# Patient Record
Sex: FEMALE | Race: WHITE | NOT HISPANIC OR LATINO | Employment: FULL TIME | URBAN - METROPOLITAN AREA
[De-identification: names, ages, dates, MRNs, and addresses within clinical notes are randomized per-mention and may not be internally consistent; named-entity substitution may affect disease eponyms.]

---

## 2019-06-18 ENCOUNTER — TELEPHONE (OUTPATIENT)
Dept: SCHEDULING | Facility: IMAGING CENTER | Age: 29
End: 2019-06-18

## 2019-08-02 ENCOUNTER — OFFICE VISIT (OUTPATIENT)
Dept: MEDICAL GROUP | Facility: LAB | Age: 29
End: 2019-08-02
Payer: COMMERCIAL

## 2019-08-02 VITALS
SYSTOLIC BLOOD PRESSURE: 110 MMHG | HEART RATE: 72 BPM | OXYGEN SATURATION: 96 % | BODY MASS INDEX: 36.15 KG/M2 | WEIGHT: 217 LBS | TEMPERATURE: 98.4 F | DIASTOLIC BLOOD PRESSURE: 70 MMHG | HEIGHT: 65 IN

## 2019-08-02 DIAGNOSIS — E66.9 OBESITY (BMI 35.0-39.9 WITHOUT COMORBIDITY): ICD-10-CM

## 2019-08-02 DIAGNOSIS — Z00.00 WELL ADULT EXAM: ICD-10-CM

## 2019-08-02 PROCEDURE — 99385 PREV VISIT NEW AGE 18-39: CPT | Performed by: NURSE PRACTITIONER

## 2019-08-02 ASSESSMENT — PATIENT HEALTH QUESTIONNAIRE - PHQ9: CLINICAL INTERPRETATION OF PHQ2 SCORE: 0

## 2019-08-02 NOTE — LETTER
[facility]  JUDY NiRChayaN.  [address]  [place] [address]  Fax: [phone]   Authorization for Release/Disclosure of   Protected Health Information   Name: CHEYANNE CAVAZOS : 1990 SSN: [id number]   Address: [facility] [address] [place] [place] [address] Phone:    [phone] (home)    I authorize the entity listed below to release/disclose the PHI below to:   [facility]/JUDY NiRJACINTO and DAVID Ni   Provider or Entity Name:     Address   City, State, Zip   Phone:      Fax:     Reason for request: continuity of care   Information to be released:    [  ] LAST COLONOSCOPY,  including any PATH REPORT and follow-up  [  ] LAST FIT/COLOGUARD RESULT [  ] LAST DEXA  [  ] LAST MAMMOGRAM  [  ] LAST PAP  [  ] LAST LABS [  ] RETINA EXAM REPORT  [  ] IMMUNIZATION RECORDS  [  ] Release all info      [  ] Check here and initial the line next to each item to release ALL health information INCLUDING  _____ Care and treatment for drug and / or alcohol abuse  _____ HIV testing, infection status, or AIDS  _____ Genetic Testing    DATES OF SERVICE OR TIME PERIOD TO BE DISCLOSED: _____________  I understand and acknowledge that:  * This Authorization may be revoked at any time by you in writing, except if your health information has already been used or disclosed.  * Your health information that will be used or disclosed as a result of you signing this authorization could be re-disclosed by the recipient. If this occurs, your re-disclosed health information may no longer be protected by State or Federal laws.  * You may refuse to sign this Authorization. Your refusal will not affect your ability to obtain treatment.  * This Authorization becomes effective upon signing and will  on (date) __________.      If no date is indicated, this Authorization will  one (1) year from the signature date.    Name: Cheyanne Cavazos    Signature:   Date:     2019            PLEASE FAX REQUESTED RECORDS BACK TO: [phone]

## 2019-08-02 NOTE — PROGRESS NOTES
Subjective:     CC:   Chief Complaint   Patient presents with   • Establish Care     Annual exam       HPI:   Cheyanne Cavazos is a 29 y.o. female who presents for annual exam. She is feeling well and denies any complaints.    Ob-Gyn/ History:    Patient has GYN provider: yes  Clara rainey  /Para:  Last Pap Smear:  2017. No history of abnormal pap smears.  Gyn Surgery:  no  Current Contraceptive Method:  No. She is currently sexually active.  Last menstrual period:  2019.  Periods regular. moderate bleeding. Cramping is mild.   She does not take OTC analgesics for cramps.  No significant bloating/fluid retention, pelvic pain, or dyspareunia. No vaginal discharge      Health Maintenance  Cholesterol Screening: Lipid panel ordered  Diabetes Screening: Fasting blood sugar ordered  Aspirin Use: Declines  Diet: Low carb/loose keto.  Exercise: No regular exercise but is very active throughout the day with a toddler.  Substance Abuse: Declines.  Has distant history of IV drug use.  Safe in relationship.  Yes  Seat belts, bike helmet, gun safety discussed.  Sun protection used.    Cancer screening  Colorectal Cancer Screening: Due at age 49  Lung Cancer Screening: Not indicated  Cervical Cancer Screening: Done in 2017 with the birth of her baby.  Records requested  Breast Cancer Screening: Due at age 39    Infectious disease screening/Immunizations  --STI Screening: Declines  --Practices safe sex.  --HIV Screening: Declined  --Hepatitis C Screening: Not indicated  --Immunizations:    Influenza: Annually   Tetanus: 2017 up-to-date.        She  has a past medical history of Functional ovarian cysts, History of intravenous drug use in remission (2014), Mood disorder (HCC) (2014), and Seasonal allergies. She also has no past medical history of Diabetes.  She  has a past surgical history that includes tonsillectomy ().    Family History   Problem Relation Age of Onset   • Non-contributory Mother     • Non-contributory Father    • Cancer Paternal Uncle         lung cancer   • Cancer Maternal Grandfather    • Cancer Paternal Grandfather    • Heart Attack Paternal Grandfather         x 2   • Hypertension Maternal Aunt    • Diabetes Neg Hx    • Stroke Neg Hx        Social History     Socioeconomic History   • Marital status: Single     Spouse name: Not on file   • Number of children: 0   • Years of education: Not on file   • Highest education level: Not on file   Occupational History   • Occupation: sales     Employer: [employer]   Social Needs   • Financial resource strain: Not on file   • Food insecurity:     Worry: Not on file     Inability: Not on file   • Transportation needs:     Medical: Not on file     Non-medical: Not on file   Tobacco Use   • Smoking status: Former Smoker     Packs/day: 0.50     Years: 8.00     Pack years: 4.00     Start date: 1/1/2006     Last attempt to quit: 1/1/2016     Years since quitting: 3.5   • Smokeless tobacco: Never Used   Substance and Sexual Activity   • Alcohol use: Yes     Comment: occ   • Drug use: No     Types: IV     Comment: hx of heroin for about 4 months clean x 1 year   • Sexual activity: Never     Partners: Male   Lifestyle   • Physical activity:     Days per week: Not on file     Minutes per session: Not on file   • Stress: Not on file   Relationships   • Social connections:     Talks on phone: Not on file     Gets together: Not on file     Attends Jewish service: Not on file     Active member of club or organization: Not on file     Attends meetings of clubs or organizations: Not on file     Relationship status: Not on file   • Intimate partner violence:     Fear of current or ex partner: Not on file     Emotionally abused: Not on file     Physically abused: Not on file     Forced sexual activity: Not on file   Other Topics Concern   • Not on file   Social History Narrative   • Not on file       Patient Active Problem List    Diagnosis Date Noted   • History  "of intravenous drug use in remission 02/21/2014   • Mood disorder (HCC) 02/21/2014   • Tobacco dependence 02/21/2014   • Insomnia 02/21/2014         Current Outpatient Medications   Medication Sig Dispense Refill   • acetaminophen (TYLENOL) 325 MG TABS Take 650 mg by mouth every four hours as needed.     • risperidone (RISPERDAL) 1 MG TABS Take 1 Tab by mouth every bedtime. (Patient not taking: Reported on 8/2/2019) 30 Tab 0     No current facility-administered medications for this visit.      Allergies   Allergen Reactions   • Amoxicillin        Review of Systems   Constitutional: Negative for fever, chills.  Positive for fatigue and weight gain  HENT: Negative for congestion.    Eyes: Negative for pain.   Respiratory: Negative for cough and shortness of breath.    Cardiovascular: Negative for leg swelling.   Gastrointestinal: Negative for nausea, vomiting, abdominal pain and diarrhea.   Genitourinary: Negative for dysuria and hematuria.   Skin: Negative for rash.  Positive for dry skin  Neurological: Negative for dizziness, focal weakness and headaches.   Endo/Heme/Allergies: Does not bruise/bleed easily.   Psychiatric/Behavioral: Negative for depression.  The patient is not nervous/anxious.      Objective:     /70 (BP Location: Right arm, Patient Position: Sitting, BP Cuff Size: Large adult)   Pulse 72   Temp 36.9 °C (98.4 °F) (Temporal)   Ht 1.651 m (5' 5\")   Wt 98.4 kg (217 lb)   LMP 07/23/2019 (Exact Date)   SpO2 96%   BMI 36.11 kg/m²   Body mass index is 36.11 kg/m².  Wt Readings from Last 4 Encounters:   08/02/19 98.4 kg (217 lb)   07/16/15 88.5 kg (195 lb)   02/21/14 97.4 kg (214 lb 12.8 oz)   05/05/12 79.4 kg (175 lb)       Physical Exam:  Constitutional: Obese.  Well-developed and well-nourished. Not diaphoretic. No distress.   Skin: Skin is warm and dry. No rash noted.  Head: Atraumatic without lesions.  Eyes: Conjunctivae and extraocular motions are normal. Pupils are equal, round, and " reactive to light. No scleral icterus.   Ears:  External ears unremarkable. Tympanic membranes clear and intact.  Nose: Nares patent. Septum midline. Turbinates without erythema nor edema. No discharge.   Mouth/Throat:Tongue normal. Oropharynx is clear and moist. Posterior pharynx without erythema or exudates.  Neck: Supple, trachea midline. Normal range of motion. No thyromegaly present. No lymphadenopathy--cervical or supraclavicular.  Cardiovascular: Regular rate and rhythm, S1 and S2 without murmur, rubs, or gallops.  Lungs: Normal inspiratory effort, CTA bilaterally, no wheezes/rhonchi/rales  Abdomen: Soft, non tender, and without distention. Active bowel sounds in all four quadrants. No rebound, guarding, masses or HSM.  Extremities: No cyanosis, clubbing, erythema, nor edema. Distal pulses intact and symmetric.   Musculoskeletal: All major joints AROM full in all directions without pain.  Neurological: Alert and oriented x 3. Sensation intact. Negative Rhomberg.  Psychiatric:  Behavior, mood, and affect are appropriate.      Assessment and Plan:     1. Well adult exam  Patient identified as having weight management issue.  Appropriate orders and counseling given.    CBC WITH DIFFERENTIAL    Comp Metabolic Panel    HEMOGLOBIN A1C    Lipid Profile    VITAMIN D,25 HYDROXY    TSH    FREE THYROXINE   2. Obesity (BMI 35.0-39.9 without comorbidity)  Patient identified as having weight management issue.  Appropriate orders and counseling given.    CBC WITH DIFFERENTIAL    Comp Metabolic Panel    HEMOGLOBIN A1C    Lipid Profile    VITAMIN D,25 HYDROXY    TSH    FREE THYROXINE       HCM: Reviewed with patient  All Healthcare maintenance up-to-date  Pap requested from prior provider  Labs per orders  Immunizations per orders  Patient counseled about skin care, diet, supplements, prenatal vitamins, safe sex and exercise.    Follow-up: Return in about 1 year (around 8/2/2020) for Preventative/Annual physical.

## 2019-08-10 ENCOUNTER — HOSPITAL ENCOUNTER (OUTPATIENT)
Dept: LAB | Facility: MEDICAL CENTER | Age: 29
End: 2019-08-10
Attending: NURSE PRACTITIONER
Payer: COMMERCIAL

## 2019-08-10 DIAGNOSIS — Z00.00 WELL ADULT EXAM: ICD-10-CM

## 2019-08-10 DIAGNOSIS — E66.9 OBESITY (BMI 35.0-39.9 WITHOUT COMORBIDITY): ICD-10-CM

## 2019-08-10 LAB
25(OH)D3 SERPL-MCNC: 24 NG/ML (ref 30–100)
ALBUMIN SERPL BCP-MCNC: 4.1 G/DL (ref 3.2–4.9)
ALBUMIN/GLOB SERPL: 1.4 G/DL
ALP SERPL-CCNC: 40 U/L (ref 30–99)
ALT SERPL-CCNC: 16 U/L (ref 2–50)
ANION GAP SERPL CALC-SCNC: 8 MMOL/L (ref 0–11.9)
AST SERPL-CCNC: 15 U/L (ref 12–45)
BASOPHILS # BLD AUTO: 0.4 % (ref 0–1.8)
BASOPHILS # BLD: 0.02 K/UL (ref 0–0.12)
BILIRUB SERPL-MCNC: 0.3 MG/DL (ref 0.1–1.5)
BUN SERPL-MCNC: 11 MG/DL (ref 8–22)
CALCIUM SERPL-MCNC: 9.5 MG/DL (ref 8.5–10.5)
CHLORIDE SERPL-SCNC: 104 MMOL/L (ref 96–112)
CHOLEST SERPL-MCNC: 198 MG/DL (ref 100–199)
CO2 SERPL-SCNC: 27 MMOL/L (ref 20–33)
CREAT SERPL-MCNC: 0.7 MG/DL (ref 0.5–1.4)
EOSINOPHIL # BLD AUTO: 0.05 K/UL (ref 0–0.51)
EOSINOPHIL NFR BLD: 1.1 % (ref 0–6.9)
ERYTHROCYTE [DISTWIDTH] IN BLOOD BY AUTOMATED COUNT: 45.4 FL (ref 35.9–50)
EST. AVERAGE GLUCOSE BLD GHB EST-MCNC: 108 MG/DL
GLOBULIN SER CALC-MCNC: 3 G/DL (ref 1.9–3.5)
GLUCOSE SERPL-MCNC: 88 MG/DL (ref 65–99)
HBA1C MFR BLD: 5.4 % (ref 0–5.6)
HCT VFR BLD AUTO: 41.7 % (ref 37–47)
HDLC SERPL-MCNC: 45 MG/DL
HGB BLD-MCNC: 13.6 G/DL (ref 12–16)
IMM GRANULOCYTES # BLD AUTO: 0.01 K/UL (ref 0–0.11)
IMM GRANULOCYTES NFR BLD AUTO: 0.2 % (ref 0–0.9)
LDLC SERPL CALC-MCNC: 142 MG/DL
LYMPHOCYTES # BLD AUTO: 1.69 K/UL (ref 1–4.8)
LYMPHOCYTES NFR BLD: 35.6 % (ref 22–41)
MCH RBC QN AUTO: 30.4 PG (ref 27–33)
MCHC RBC AUTO-ENTMCNC: 32.6 G/DL (ref 33.6–35)
MCV RBC AUTO: 93.1 FL (ref 81.4–97.8)
MONOCYTES # BLD AUTO: 0.33 K/UL (ref 0–0.85)
MONOCYTES NFR BLD AUTO: 6.9 % (ref 0–13.4)
NEUTROPHILS # BLD AUTO: 2.65 K/UL (ref 2–7.15)
NEUTROPHILS NFR BLD: 55.8 % (ref 44–72)
NRBC # BLD AUTO: 0 K/UL
NRBC BLD-RTO: 0 /100 WBC
PLATELET # BLD AUTO: 261 K/UL (ref 164–446)
PMV BLD AUTO: 10.4 FL (ref 9–12.9)
POTASSIUM SERPL-SCNC: 4.4 MMOL/L (ref 3.6–5.5)
PROT SERPL-MCNC: 7.1 G/DL (ref 6–8.2)
RBC # BLD AUTO: 4.48 M/UL (ref 4.2–5.4)
SODIUM SERPL-SCNC: 139 MMOL/L (ref 135–145)
T4 FREE SERPL-MCNC: 0.93 NG/DL (ref 0.53–1.43)
TRIGL SERPL-MCNC: 53 MG/DL (ref 0–149)
TSH SERPL DL<=0.005 MIU/L-ACNC: 1.52 UIU/ML (ref 0.38–5.33)
WBC # BLD AUTO: 4.8 K/UL (ref 4.8–10.8)

## 2019-08-10 PROCEDURE — 84439 ASSAY OF FREE THYROXINE: CPT

## 2019-08-10 PROCEDURE — 36415 COLL VENOUS BLD VENIPUNCTURE: CPT

## 2019-08-10 PROCEDURE — 84443 ASSAY THYROID STIM HORMONE: CPT

## 2019-08-10 PROCEDURE — 85025 COMPLETE CBC W/AUTO DIFF WBC: CPT

## 2019-08-10 PROCEDURE — 82306 VITAMIN D 25 HYDROXY: CPT

## 2019-08-10 PROCEDURE — 83036 HEMOGLOBIN GLYCOSYLATED A1C: CPT

## 2019-08-10 PROCEDURE — 80061 LIPID PANEL: CPT

## 2019-08-10 PROCEDURE — 80053 COMPREHEN METABOLIC PANEL: CPT

## 2019-09-26 ENCOUNTER — TELEPHONE (OUTPATIENT)
Dept: MEDICAL GROUP | Facility: LAB | Age: 29
End: 2019-09-26

## 2019-09-26 NOTE — TELEPHONE ENCOUNTER
VOICEMAIL  1. Caller Name: Cheyanne                      Call Back Number: [phone] (home)     2. Message: Cheyanne left a voicemail, she would like her lab results.    3. Patient approves office to leave a detailed voicemail/MyChart message: yes

## 2019-11-19 ENCOUNTER — OFFICE VISIT (OUTPATIENT)
Dept: MEDICAL GROUP | Facility: LAB | Age: 29
End: 2019-11-19
Payer: COMMERCIAL

## 2019-11-19 VITALS
HEIGHT: 65 IN | BODY MASS INDEX: 38.05 KG/M2 | DIASTOLIC BLOOD PRESSURE: 70 MMHG | RESPIRATION RATE: 18 BRPM | TEMPERATURE: 98.1 F | SYSTOLIC BLOOD PRESSURE: 112 MMHG | WEIGHT: 228.4 LBS | OXYGEN SATURATION: 97 % | HEART RATE: 68 BPM

## 2019-11-19 DIAGNOSIS — F33.1 MODERATE EPISODE OF RECURRENT MAJOR DEPRESSIVE DISORDER (HCC): ICD-10-CM

## 2019-11-19 DIAGNOSIS — F41.9 ANXIETY: ICD-10-CM

## 2019-11-19 PROCEDURE — 99214 OFFICE O/P EST MOD 30 MIN: CPT | Performed by: NURSE PRACTITIONER

## 2019-11-19 RX ORDER — HYDROXYZINE 50 MG/1
50 TABLET, FILM COATED ORAL 3 TIMES DAILY PRN
Qty: 30 TAB | Refills: 1 | Status: SHIPPED | OUTPATIENT
Start: 2019-11-19 | End: 2019-11-19 | Stop reason: SDUPTHER

## 2019-11-19 RX ORDER — HYDROXYZINE 50 MG/1
50 TABLET, FILM COATED ORAL 3 TIMES DAILY PRN
Qty: 30 TAB | Refills: 1 | Status: SHIPPED | OUTPATIENT
Start: 2019-11-19 | End: 2023-04-28 | Stop reason: SDUPTHER

## 2019-11-19 RX ORDER — SERTRALINE HYDROCHLORIDE 25 MG/1
25 TABLET, FILM COATED ORAL DAILY
Qty: 30 TAB | Refills: 2 | Status: SHIPPED | OUTPATIENT
Start: 2019-11-19 | End: 2019-11-19 | Stop reason: SDUPTHER

## 2019-11-19 RX ORDER — SERTRALINE HYDROCHLORIDE 25 MG/1
25 TABLET, FILM COATED ORAL DAILY
Qty: 30 TAB | Refills: 2 | Status: SHIPPED
Start: 2019-11-19 | End: 2020-01-14

## 2019-11-19 ASSESSMENT — ANXIETY QUESTIONNAIRES
3. WORRYING TOO MUCH ABOUT DIFFERENT THINGS: NEARLY EVERY DAY
2. NOT BEING ABLE TO STOP OR CONTROL WORRYING: SEVERAL DAYS
GAD7 TOTAL SCORE: 10
1. FEELING NERVOUS, ANXIOUS, OR ON EDGE: SEVERAL DAYS
7. FEELING AFRAID AS IF SOMETHING AWFUL MIGHT HAPPEN: SEVERAL DAYS
5. BEING SO RESTLESS THAT IT IS HARD TO SIT STILL: NOT AT ALL
4. TROUBLE RELAXING: MORE THAN HALF THE DAYS
6. BECOMING EASILY ANNOYED OR IRRITABLE: MORE THAN HALF THE DAYS

## 2019-11-19 ASSESSMENT — PATIENT HEALTH QUESTIONNAIRE - PHQ9
SUM OF ALL RESPONSES TO PHQ9 QUESTIONS 1 AND 2: 3
6. FEELING BAD ABOUT YOURSELF - OR THAT YOU ARE A FAILURE OR HAVE LET YOURSELF OR YOUR FAMILY DOWN: SEVERAL DAYS
3. TROUBLE FALLING OR STAYING ASLEEP OR SLEEPING TOO MUCH: MORE THAN HALF THE DAYS
2. FEELING DOWN, DEPRESSED, IRRITABLE, OR HOPELESS: SEVERAL DAYS
SUM OF ALL RESPONSES TO PHQ QUESTIONS 1-9: 11
7. TROUBLE CONCENTRATING ON THINGS, SUCH AS READING THE NEWSPAPER OR WATCHING TELEVISION: NOT AT ALL
5. POOR APPETITE OR OVEREATING: SEVERAL DAYS
4. FEELING TIRED OR HAVING LITTLE ENERGY: NEARLY EVERY DAY
8. MOVING OR SPEAKING SO SLOWLY THAT OTHER PEOPLE COULD HAVE NOTICED. OR THE OPPOSITE, BEING SO FIGETY OR RESTLESS THAT YOU HAVE BEEN MOVING AROUND A LOT MORE THAN USUAL: NOT AT ALL
9. THOUGHTS THAT YOU WOULD BE BETTER OFF DEAD, OR OF HURTING YOURSELF: SEVERAL DAYS
1. LITTLE INTEREST OR PLEASURE IN DOING THINGS: MORE THAN HALF THE DAYS

## 2019-11-19 NOTE — PROGRESS NOTES
CC: Depression; anxiety    HISTORY OF PRESENT ILLNESS: Cheyanne Cavazos is a 29 y.o. female established patient who presents today to discuss the following problems:    Depression  Anxiety  This is a new problem discussed which has been long-standing for patient.  She has tried controlling symptoms with diet and exercise and at this point comes in to discuss medication options.  She is considering trying to become pregnant next spring and would like to control depression prior to that time.   Onset was approximately 10 years ago, unchanged since that time. Current symptoms include depressed mood, anhedonia, weight gain, insomnia, fatigue, feelings of worthlessness/guilt and suicidal thoughts without plan. Patient denies weight loss, hypersomnia, psychomotor retardation, suicidal thoughts with specific plan and suicidal attempt. She denies current suicidal and homicidal plan or intent. Family history significant for depression,. Possible organic causes contributing are: none. Risk factors: positive family history in patient's mother Previous treatment includes none.   She does have a history of being seen by psychiatry when she was using drugs and put on risperidone for unknown reasons.  She denies any history of manic behavior or panic attacks.       Depression Screening    Little interest or pleasure in doing things?   More than half the days  Feeling down, depressed , or hopeless?  Several days  Trouble falling or staying asleep, or sleeping too much?   More than half the days  Feeling tired or having little energy?   Nearly every day  Poor appetite or overeating?   Several days  Feeling bad about yourself - or that you are a failure or have let yourself or your family down?  Several days  Trouble concentrating on things, such as reading the newspaper or watching television?  Not at all  Moving or speaking so slowly that other people could have noticed.  Or the opposite - being so fidgety or restless that you have  been moving around a lot more than usual?   Not at all  Thoughts that you would be better off dead, or of hurting yourself?   Several days  Patient Health Questionnaire Score:  (!) 11      If depressive symptoms identified deferred to follow up visit unless specifically addressed in assesment and plan.    Interpretation of PHQ-9 Total Score   Score Severity   1-4 No Depression   5-9 Mild Depression   10-14 Moderate Depression   15-19 Moderately Severe Depression   20-27 Severe Depression  MARIAM-7 Questionnaire    Feeling nervous, anxious, or on edge: Several days  Not being able to sop or control worrying: Several days  Worrying too much about different things: Nearly every day  Trouble relaxing: More than half the days  Being so restless that it's hard to sit still: Not at all  Becoming easily annoyed or irritable: More than half the days  Feeling afraid as if something awful might happen: Several days  Total: 10    Interpretation of MARIAM 7 Total Score   Score Severity :  0-4 No Anxiety   5-9 Mild Anxiety  10-14 Moderate Anxiety  15-21 Severe Anxiety      Health Maintenance: Completed    Patient Active Problem List    Diagnosis Date Noted   • Obesity (BMI 35.0-39.9 without comorbidity) ([facility]) 08/02/2019   • History of intravenous drug use in remission 02/21/2014   • Mood disorder ([facility]) 02/21/2014   • Tobacco dependence 02/21/2014   • Insomnia 02/21/2014        Allergies:Amoxicillin    Current Outpatient Medications   Medication Sig Dispense Refill   • acetaminophen (TYLENOL) 325 MG TABS Take 650 mg by mouth every four hours as needed.     • risperidone (RISPERDAL) 1 MG TABS Take 1 Tab by mouth every bedtime. (Patient not taking: Reported on 8/2/2019) 30 Tab 0     No current facility-administered medications for this visit.        Social History     Tobacco Use   • Smoking status: Former Smoker     Packs/day: 0.50     Years: 8.00     Pack years: 4.00     Start date: 1/1/2006     Last attempt to quit: 1/1/2016     Years  "since quitting: 3.8   • Smokeless tobacco: Never Used   Substance Use Topics   • Alcohol use: Yes     Comment: occ   • Drug use: No     Types: IV     Comment: hx of heroin for about 4 months clean x 1 year     Social History     Patient does not qualify to have social determinant information on file (likely too young).   Social History Narrative   • Not on file       Family History   Problem Relation Age of Onset   • Non-contributory Mother    • Non-contributory Father    • Cancer Paternal Uncle         lung cancer   • Cancer Maternal Grandfather    • Cancer Paternal Grandfather    • Heart Attack Paternal Grandfather         x 2   • Hypertension Maternal Aunt    • Diabetes Neg Hx    • Stroke Neg Hx        ROS:     No fevers or weight loss  No headaches or sore throat  No chest pain or shortness of breath  No bowel changes  No lower extremity edema  No active suicidal ideation or panic attacks        EXAM:   /70 (BP Location: Left arm, Patient Position: Sitting, BP Cuff Size: Adult)   Pulse 68   Temp 36.7 °C (98.1 °F) (Temporal)   Resp 18   Ht 1.651 m (5' 5\")   Wt 103.6 kg (228 lb 6.4 oz)   SpO2 97%  There is no height or weight on file to calculate BMI.    Constitutional: Obese. Well-developed and well-nourished. Not diaphoretic. No distress.   Skin: Skin is warm and dry. No rash noted.  Head: Atraumatic without lesions.  Neck: Supple, trachea midline. No thyromegaly present. No cervical or supraclavicular lymphadenopathy.  Cardiovascular: Regular rate and rhythm.   Chest: Effort normal. Clear to auscultation throughout. No adventitious sounds.   Abdomen: Soft, non tender, and without distention. Active bowel sounds in all four quadrants. No rebound, guarding, masses or hepatosplenomegaly.  Extremities: No cyanosis, clubbing, erythema, nor edema.   Neurological: Alert and oriented x 3. Sensation intact.   Psychiatric:  Behavior, mood, and affect are appropriate.       ASSESSMENT/PLAN:    1. Moderate " episode of recurrent major depressive disorder (HCC)  2. Anxiety  New problem uncontrolled. Declines referral to behavioral health but will consider if symptoms are not controlled. No active suicidal thoughts.   Had a long discussion with patient regarding risk and benefit of starting an antidepressant medications in the past. Patient was informed to let me know immediately if she has any suicidal ideation. She was informed of all the potential side effects and the risks and benefits of this medication and patient wishes to proceed with this treatment.  SSRI Black Box Warnings reviewed with pt:   1) Anxiety, agitation, panic attacks, insomnia, irritability, hostility, aggressiveness, impulsivity, hypomania and ramos have been reported in adult and pediatric patients being treated with SSRI for major depressive disorder as well as for other indications.    2) Although a causal link between the emergence of such symptoms and either worsening of depression and/or the emergence of suicidal impulses has not been established, there is concern that such symptoms may represent precursors to emerging suicidality especially if these symptoms are severe, abrupt in onset, or were not part of the patient's presenting symptoms.    3) Daily observation of such symptoms is advised by family and caregivers in first 4 weeks after initiation of medication or dose adjustment up or down.    4) If such symptoms are observed, family advised to contact PCP immediately, and consider calling the National Suicide Prevention Lifeline (1133.296.6941) or 890, or transporting patient to the emergency department for immediate evaluation.     - sertraline (ZOLOFT) 25 MG tablet; Take 1 Tab by mouth every day.  Dispense: 30 Tab; Refill: 2  - hydrOXYzine HCl (ATARAX) 50 MG Tab; Take 1 Tab by mouth 3 times a day as needed for Anxiety.  Dispense: 30 Tab; Refill: 1          Return in about 6 weeks (around 12/31/2019).       Please note that this  dictation was created using voice recognition software. I have made every reasonable attempt to correct obvious errors, but I expect that there are errors of grammar and possibly content that I did not discover before finalizing the note.

## 2020-01-14 ENCOUNTER — OFFICE VISIT (OUTPATIENT)
Dept: MEDICAL GROUP | Facility: LAB | Age: 30
End: 2020-01-14

## 2020-01-14 VITALS
DIASTOLIC BLOOD PRESSURE: 68 MMHG | WEIGHT: 233 LBS | HEIGHT: 65 IN | TEMPERATURE: 97.7 F | SYSTOLIC BLOOD PRESSURE: 116 MMHG | HEART RATE: 78 BPM | BODY MASS INDEX: 38.82 KG/M2 | OXYGEN SATURATION: 96 % | RESPIRATION RATE: 18 BRPM

## 2020-01-14 DIAGNOSIS — F33.1 MODERATE EPISODE OF RECURRENT MAJOR DEPRESSIVE DISORDER (HCC): ICD-10-CM

## 2020-01-14 PROCEDURE — 99214 OFFICE O/P EST MOD 30 MIN: CPT | Performed by: NURSE PRACTITIONER

## 2020-01-14 ASSESSMENT — PATIENT HEALTH QUESTIONNAIRE - PHQ9: CLINICAL INTERPRETATION OF PHQ2 SCORE: 0

## 2020-01-14 NOTE — PROGRESS NOTES
CC:The encounter diagnosis was Moderate episode of recurrent major depressive disorder (HCC).    HISTORY OF PRESENT ILLNESS: Cheyanne Cavazos is a 29 y.o. female established patient who presents today to discuss the following problems:    Depression  Anxiety  Patient is here today to follow-up on her depression and anxiety for which we had started her previously on Zoloft.  She reports that her symptoms are very much improved on the Zoloft.  States that her  even notices a difference.  She would like to increase the dose to 50 mg today as she feels like it is definitely working but it could be a little better.  Side effects.  No suicidal or homicidal ideation.  She is traveling to [facility] soon for 2 weeks and she is looking forward to that upcoming vacation.  She has not been taking the Atarax prescribed as it made her too sleepy but she feels her anxiety is also improved since starting on the Zoloft.      Depression Screening    Little interest or pleasure in doing things?  0 - not at all  Feeling down, depressed , or hopeless? 0 - not at all  Patient Health Questionnaire Score: 0    If depressive symptoms identified deferred to follow up visit unless specifically addressed in assesment and plan.      Interpretation of PHQ-9 Total Score   Score Severity   1-4 Minimal Depression   5-9 Mild Depression   10-14 Moderate Depression   15-19 Moderately Severe Depression   20-27 Severe Depression      Health Maintenance: Completed    Patient Active Problem List    Diagnosis Date Noted   • Moderate episode of recurrent major depressive disorder (HCC) 11/19/2019   • Obesity (BMI 35.0-39.9 without comorbidity) ([facility]) 08/02/2019   • History of intravenous drug use in remission 02/21/2014   • Mood disorder (HCC) 02/21/2014   • Tobacco dependence 02/21/2014   • Insomnia 02/21/2014        Allergies:Amoxicillin    Current Outpatient Medications   Medication Sig Dispense Refill   • sertraline (ZOLOFT) 50 MG Tab Take 1 Tab by  "mouth every day. 30 Tab 11   • hydrOXYzine HCl (ATARAX) 50 MG Tab Take 1 Tab by mouth 3 times a day as needed for Anxiety. 30 Tab 1   • acetaminophen (TYLENOL) 325 MG TABS Take 650 mg by mouth every four hours as needed.       No current facility-administered medications for this visit.        Social History     Tobacco Use   • Smoking status: Former Smoker     Packs/day: 0.50     Years: 8.00     Pack years: 4.00     Start date: 2006     Last attempt to quit: 2016     Years since quittin.0   • Smokeless tobacco: Never Used   Substance Use Topics   • Alcohol use: Yes     Comment: occ   • Drug use: No     Types: IV     Comment: hx of heroin for about 4 months clean x 1 year     Social History     Patient does not qualify to have social determinant information on file (likely too young).   Social History Narrative   • Not on file       Family History   Problem Relation Age of Onset   • Non-contributory Mother    • Non-contributory Father    • Cancer Paternal Uncle         lung cancer   • Cancer Maternal Grandfather    • Cancer Paternal Grandfather    • Heart Attack Paternal Grandfather         x 2   • Hypertension Maternal Aunt    • Diabetes Neg Hx    • Stroke Neg Hx        ROS:     No fevers or weight loss  No headaches or sore throat  No chest pain or shortness of breath  No bowel changes  No lower extremity edema  No suicidal ideation or panic attack          EXAM:   /68   Pulse 78   Temp 36.5 °C (97.7 °F)   Resp 18   Ht 1.651 m (5' 5\")   Wt 105.7 kg (233 lb)   SpO2 96%  Body mass index is 38.77 kg/m².    Constitutional: Well-developed and well-nourished. Not diaphoretic. No distress.   Skin: Skin is warm and dry. No rash noted.  Head: Atraumatic without lesions.  Neck: Supple, trachea midline. No thyromegaly present. No cervical or supraclavicular lymphadenopathy.  Cardiovascular: Regular rate and rhythm.   Chest: Effort normal. Clear to auscultation throughout. No adventitious sounds. "   Abdomen: Soft, non tender, and without distention. Active bowel sounds in all four quadrants. No rebound, guarding, masses or hepatosplenomegaly.  Extremities: No cyanosis, clubbing, erythema, nor edema.   Neurological: Alert and oriented x 3. Sensation intact.   Psychiatric:  Behavior, mood, and affect are appropriate.       ASSESSMENT/PLAN:    1. Moderate episode of recurrent major depressive disorder (HCC)  Chronic stable.  Patient is feeling well on current medications but would like to increase dose. Will continue. Denies any suicidal or homicidal ideation. Emphasized importance of healthy diet and exercise. Discussed that should the patient have any symptoms they should call  suicide prevention hotline or report to the emergency room immediately.    - sertraline (ZOLOFT) 50 MG Tab; Take 1 Tab by mouth every day.  Dispense: 90 Tab; Refill: 3      Return in about 6 months (around 7/14/2020) for Depression.       Please note that this dictation was created using voice recognition software. I have made every reasonable attempt to correct obvious errors, but I expect that there are errors of grammar and possibly content that I did not discover before finalizing the note.

## 2020-05-20 ENCOUNTER — TELEPHONE (OUTPATIENT)
Dept: MEDICAL GROUP | Facility: LAB | Age: 30
End: 2020-05-20

## 2020-05-20 NOTE — TELEPHONE ENCOUNTER
Offered pt a NP APPT she declined. She just received new insurance and has not decided where she wants to go.

## 2020-09-18 ENCOUNTER — TELEPHONE (OUTPATIENT)
Dept: SCHEDULING | Facility: IMAGING CENTER | Age: 30
End: 2020-09-18

## 2020-09-20 NOTE — PROGRESS NOTES
"Subjective:     CC:  Diagnoses of Ulcer of the throat, Enlarged tongue, Moderate episode of recurrent major depressive disorder (HCC), History of intravenous drug use in remission, Thyromegaly, Hyperlipidemia, unspecified hyperlipidemia type, and Vitamin D deficiency were pertinent to this visit.    HISTORY OF THE PRESENT ILLNESS: Patient is a 30 y.o. female. This pleasant patient is here today to establish care and discuss tongue abnormality and throat ulcer. Her prior PCP was Dr. Jeny Simon.    Ulcer of the throat  This is a chronic condition.  Onset: about 2 years ago  Location: both tonsils in the back  Duration: intermittent  Quality: painful when present  Modifying factors: worse when eating sugar.  Improves with salt gargle and mouth wash  Precipitating trauma: None  Associated symptoms:  No fevers, chills.  She is able to eat normally.  Sometimes feels like she has something stuck in her throat        Tongue abnormality  This is a chronic condition.  Onset: about 2 years  Location: posterior tongue tastebuds are swollen  Duration: chronic  Quality: feels like her throat is \"smaller\"  Modifying factors: none  Precipitating trauma: none  Associated symptoms: has some trouble swallowing but breathing is normal      Moderate episode of recurrent major depressive disorder (HCC)  Chronic.  Has been on zoloft since 12/2019 which she reports is doing well.  No negative SEs.  PHQ9 today were 3.  No SI/Hi.  She rarely needs atarax.    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 90 Tab 3   • hydrOXYzine HCl (ATARAX) 50 MG Tab Take 1 Tab by mouth 3 times a day as needed for Anxiety. 30 Tab 1   • acetaminophen (TYLENOL) 325 MG TABS Take 650 mg by mouth every four hours as needed.       No current " "Epic-ordered facility-administered medications on file.        Past Medical History:   Diagnosis Date   • Functional ovarian cysts    • History of intravenous drug use in remission 2014   • Mood disorder (HCC) 2014   • Seasonal allergies        Past Surgical History:   Procedure Laterality Date   • TONSILLECTOMY         Social History     Tobacco Use   • Smoking status: Former Smoker     Packs/day: 0.50     Years: 8.00     Pack years: 4.00     Start date: 2006     Quit date: 2016     Years since quittin.7   • Smokeless tobacco: Never Used   Substance Use Topics   • Alcohol use: Yes     Frequency: 2-4 times a month     Comment: occ   • Drug use: No     Types: IV     Comment: hx of heroin for about 4 months clean x 1 year       Social History     Social History Narrative   • Not on file       Family History   Problem Relation Age of Onset   • Non-contributory Mother    • Non-contributory Father    • Cancer Paternal Uncle         lung cancer   • Cancer Maternal Grandfather    • Cancer Paternal Grandfather    • Heart Attack Paternal Grandfather         x 2   • Hypertension Maternal Aunt    • Cancer Other         lung cancer, cancer related to alcohol and unknown type of cancer   • Diabetes Neg Hx    • Stroke Neg Hx        Health Maintenance: pt declines flu shot    ROS:   Gen: no fevers/chills, no changes in weight  Eyes: no changes in vision  ENT: no sore throat, no hearing loss, no bloody nose  Pulm: no sob, no cough  CV: no chest pain, no palpitations  GI: no nausea/vomiting, no diarrhea  : no dysuria  MSk: no myalgias  Skin: no rash  Neuro: no headaches, no numbness/tingling  Heme/Lymph: no easy bruising      Objective:     Exam: /70 (BP Location: Right arm, Patient Position: Sitting, BP Cuff Size: Adult)   Pulse 60   Temp 36.7 °C (98.1 °F) (Temporal)   Resp 16   Ht 1.651 m (5' 5\")   Wt 111 kg (244 lb 11.4 oz)   SpO2 96%  Body mass index is 40.72 kg/m².    General: Normal " appearing. No distress.  HEENT: Normocephalic.  Canals are clear bilaterally, tympanic membranes are benign, nasal mucosa benign, oropharynx is without erythema, edema or exudates. + right posterior superficial ulcer noted +posterior tongue enlarged taste buds noted  Eyes: Eyes conjunctiva clear lids without ptosis, pupils equal and reactive to light accommodation, ears normal shape and contour  Neck: Supple. Thyroid is enlarged.  Pulmonary: Clear to ausculation.  Normal effort. No rales, ronchi, or wheezing.  Cardiovascular: Regular rate and rhythm without murmur.   Abdomen: Soft, nontender, nondistended. Normal bowel sounds. Liver and spleen are not palpable  Neurologic: No gross motor deficits  Lymph: No cervical or supraclavicular lymph nodes are palpable  Skin: Warm and dry.  No obvious lesions.  Musculoskeletal: Normal gait. No extremity cyanosis, clubbing, or edema.  Psych: Normal mood and affect. Alert and oriented x3. Judgment and insight is normal. PHQ9 = 3    Assessment & Plan:   30 y.o. female with the following -    1. Ulcer of the throat  Chronic, intermittent for 2 years, painful.  Will check HSV.  Continue salt water gargles, avoid food triggers.  ENT referral given that she feels that she has difficulty swallowing for further evaluation.  - HSV I/II IGG & IGM SERUM; Future  - REFERRAL TO ENT    2. Enlarged tongue  Chronic for more than 2 years with enlarged posterior tongue taste buds.  Reassurance given about enlarged taste buds.  ENT referral given since she is having difficulty swallowing for further evaluation of posterior tongue and upper throat.  - REFERRAL TO ENT    3. Moderate episode of recurrent major depressive disorder (HCC)  Chronic, stable, well controlled with current medication.  Continue zoloft 50mg daily.    4. History of intravenous drug use in remission  Chronic, stable.  Pt has been in remission for about 6 year.  Continue abstinence encouraged.  Will monitor    5.  Thyromegaly  New issue.  Will check labs and US.  Will monitor  - TSH; Future  - FREE THYROXINE; Future  - US-THYROID; Future    6. Hyperlipidemia, unspecified hyperlipidemia type  Chronic issue, unclear control.  Will check labs.  Encouraged low cholesterol diet, weight loss.  Will monitor  - Lipid Profile; Future    7. Vitamin D deficiency  Chronic, unclear control.  She takes a daily MV.  Will check labs and redose supplement prn  - VITAMIN 1,25 DIHYDROXY; Future    8. Morbid obesity with BMI of 40.0-44.9, adult (HCC)  - Patient identified as having weight management issue.  Appropriate orders and counseling given.        Return in about 3 months (around 12/21/2020) for Annual plus pap.    Please note that this dictation was created using voice recognition software. I have made every reasonable attempt to correct obvious errors, but I expect that there are errors of grammar and possibly content that I did not discover before finalizing the note.

## 2020-09-21 ENCOUNTER — OFFICE VISIT (OUTPATIENT)
Dept: MEDICAL GROUP | Facility: MEDICAL CENTER | Age: 30
End: 2020-09-21
Payer: COMMERCIAL

## 2020-09-21 VITALS
RESPIRATION RATE: 16 BRPM | HEART RATE: 60 BPM | HEIGHT: 65 IN | BODY MASS INDEX: 40.77 KG/M2 | TEMPERATURE: 98.1 F | DIASTOLIC BLOOD PRESSURE: 70 MMHG | SYSTOLIC BLOOD PRESSURE: 122 MMHG | OXYGEN SATURATION: 96 % | WEIGHT: 244.71 LBS

## 2020-09-21 DIAGNOSIS — K14.8 ENLARGED TONGUE: ICD-10-CM

## 2020-09-21 DIAGNOSIS — F33.1 MODERATE EPISODE OF RECURRENT MAJOR DEPRESSIVE DISORDER (HCC): ICD-10-CM

## 2020-09-21 DIAGNOSIS — E78.5 HYPERLIPIDEMIA, UNSPECIFIED HYPERLIPIDEMIA TYPE: ICD-10-CM

## 2020-09-21 DIAGNOSIS — F19.91 HISTORY OF INTRAVENOUS DRUG USE IN REMISSION: ICD-10-CM

## 2020-09-21 DIAGNOSIS — J38.7 ULCER OF THE THROAT: ICD-10-CM

## 2020-09-21 DIAGNOSIS — E66.01 MORBID OBESITY WITH BMI OF 40.0-44.9, ADULT (HCC): ICD-10-CM

## 2020-09-21 DIAGNOSIS — E55.9 VITAMIN D DEFICIENCY: ICD-10-CM

## 2020-09-21 DIAGNOSIS — E01.0 THYROMEGALY: ICD-10-CM

## 2020-09-21 PROBLEM — Q38.3 TONGUE ABNORMALITY: Status: ACTIVE | Noted: 2020-09-21

## 2020-09-21 PROCEDURE — 99204 OFFICE O/P NEW MOD 45 MIN: CPT | Performed by: FAMILY MEDICINE

## 2020-09-21 SDOH — HEALTH STABILITY: MENTAL HEALTH: HOW OFTEN DO YOU HAVE A DRINK CONTAINING ALCOHOL?: 2-4 TIMES A MONTH

## 2020-09-21 ASSESSMENT — FIBROSIS 4 INDEX: FIB4 SCORE: 0.45

## 2020-09-21 NOTE — ASSESSMENT & PLAN NOTE
This is a chronic condition.  Onset: about 2 years ago  Location: both tonsils in the back  Duration: intermittent  Quality: painful when present  Modifying factors: worse when eating sugar.  Improves with salt gargle and mouth wash  Precipitating trauma: None  Associated symptoms:  No fevers, chills.  She is able to eat normally.  Sometimes feels like she has something stuck in her throat

## 2020-09-21 NOTE — ASSESSMENT & PLAN NOTE
"This is a chronic condition.  Onset: about 2 years  Location: posterior tongue tastebuds are swollen  Duration: chronic  Quality: feels like her throat is \"smaller\"  Modifying factors: none  Precipitating trauma: none  Associated symptoms: has some trouble swallowing but breathing is normal    "

## 2020-09-21 NOTE — ASSESSMENT & PLAN NOTE
Chronic.  Has been on zoloft since 12/2019 which she reports is doing well.  No negative SEs.  PHQ9 today were 3.  No SI/Hi.  She rarely needs atarax.

## 2020-09-30 ENCOUNTER — APPOINTMENT (OUTPATIENT)
Dept: LAB | Facility: MEDICAL CENTER | Age: 30
End: 2020-09-30
Attending: FAMILY MEDICINE
Payer: COMMERCIAL

## 2020-10-05 ENCOUNTER — HOSPITAL ENCOUNTER (OUTPATIENT)
Dept: RADIOLOGY | Facility: MEDICAL CENTER | Age: 30
End: 2020-10-05
Attending: FAMILY MEDICINE
Payer: COMMERCIAL

## 2020-10-05 DIAGNOSIS — E01.0 THYROMEGALY: ICD-10-CM

## 2020-10-05 PROCEDURE — 76536 US EXAM OF HEAD AND NECK: CPT

## 2020-10-07 ENCOUNTER — OFFICE VISIT (OUTPATIENT)
Dept: MEDICAL GROUP | Facility: MEDICAL CENTER | Age: 30
End: 2020-10-07
Payer: COMMERCIAL

## 2020-10-07 VITALS
DIASTOLIC BLOOD PRESSURE: 84 MMHG | HEART RATE: 68 BPM | WEIGHT: 248.4 LBS | OXYGEN SATURATION: 95 % | BODY MASS INDEX: 41.38 KG/M2 | TEMPERATURE: 97.4 F | RESPIRATION RATE: 14 BRPM | HEIGHT: 65 IN | SYSTOLIC BLOOD PRESSURE: 134 MMHG

## 2020-10-07 DIAGNOSIS — Q38.3 TONGUE ABNORMALITY: ICD-10-CM

## 2020-10-07 DIAGNOSIS — J38.7 ULCER OF THE THROAT: ICD-10-CM

## 2020-10-07 DIAGNOSIS — E66.01 MORBID OBESITY WITH BMI OF 40.0-44.9, ADULT (HCC): ICD-10-CM

## 2020-10-07 PROCEDURE — 99213 OFFICE O/P EST LOW 20 MIN: CPT | Performed by: FAMILY MEDICINE

## 2020-10-07 ASSESSMENT — FIBROSIS 4 INDEX: FIB4 SCORE: 0.45

## 2020-10-07 NOTE — PROGRESS NOTES
Subjective:     CC: lab review    HPI:   Cheyanne presents today with     Since last visit, the sore in the back of her throat has resolved but taste buds are still large.  They are not painful.  Sometimes has some scratchiness of the tongue.  She is not having trouble swallowing but continues to feel like something is stuck.  No fevers, chills, or muscle aches.    Morbid obesity with BMI of 40.0-44.9, adult ([facility])  She has been working to eat a more well balanced diet.  Exercises infrequently.    Ulcer of the throat  Had negative HIV test in  and       Past Medical History:   Diagnosis Date   • Functional ovarian cysts    • History of intravenous drug use in remission 2014   • Mood disorder (HCC) 2014   • Seasonal allergies        Social History     Tobacco Use   • Smoking status: Former Smoker     Packs/day: 0.50     Years: 8.00     Pack years: 4.00     Start date: 2006     Quit date: 2016     Years since quittin.7   • Smokeless tobacco: Never Used   Substance Use Topics   • Alcohol use: Yes     Frequency: 2-4 times a month     Comment: occ   • Drug use: No     Types: IV     Comment: hx of heroin for about 4 months clean x 1 year       Current Outpatient Medications Ordered in Epic   Medication Sig Dispense Refill   • sertraline (ZOLOFT) 50 MG Tab Take 1 Tab by mouth every day. 90 Tab 3   • hydrOXYzine HCl (ATARAX) 50 MG Tab Take 1 Tab by mouth 3 times a day as needed for Anxiety. 30 Tab 1   • acetaminophen (TYLENOL) 325 MG TABS Take 650 mg by mouth every four hours as needed.       No current Epic-ordered facility-administered medications on file.        Allergies:  Amoxicillin    Health Maintenance: pt reports having her flu shot at [employer] after her last visit with me    ROS:  Gen: no fevers/chills, no changes in weight  Eyes: no changes in vision  ENT: no sore throat, no hearing loss, no bloody nose  Pulm: no sob, no cough  CV: no chest pain, no palpitations  GI: no nausea/vomiting, no  "diarrhea  : no dysuria  MSk: no myalgias  Skin: no rash  Neuro: no headaches, no numbness/tingling  Heme/Lymph: no easy bruising      Objective:       Exam:  /84 (BP Location: Left arm, Patient Position: Sitting, BP Cuff Size: Large adult)   Pulse 68   Temp 36.3 °C (97.4 °F) (Temporal)   Resp 14   Ht 1.651 m (5' 5\")   Wt 112.7 kg (248 lb 6.4 oz)   LMP 08/24/2019   SpO2 95%   BMI 41.34 kg/m²  Body mass index is 41.34 kg/m².    Gen: Alert and oriented, No apparent distress.  HEENT: oropharynx is without erythema, edema or exudates or ulcers.  +posterior tongue enlarged taste buds noted  Neck: Neck is supple without lymphadenopathy.  Lungs: Normal effort, CTA bilaterally, no wheezes, rhonchi, or rales  CV: Regular rate and rhythm. No murmurs, rubs, or gallops.  Ext: No clubbing, cyanosis, edema.    Assessment & Plan:     30 y.o. female with the following -     1. Morbid obesity with BMI of 40.0-44.9, adult (HCC)  Chronic, worsening with BMI of 41 today.  Dietary and exercise guidance given.  Will check labs.  - CBC WITH DIFFERENTIAL; Future  - HEMOGLOBIN A1C; Future  - Comp Metabolic Panel; Future    2. Tongue abnormality  Chronic, stable enlarged taste buds in the back of her throat.  She continues to feel like something is stuck in the back of her throat which could be due to her enlarge taste buds vs esophageal disordered.  Recommended she establish with ENT asap to further evaluate.  Will check CBC.  Encouraged her to avoid acidic foods.  Follow-up precautions given  - CBC WITH DIFFERENTIAL; Future    3. Ulcer of the throat  Chronic, intermittent for 2 years, improved since last visit with no lesion noted today.  HSV 2 IgG was positive, so it is possible her lesions are due to herpes.  Methods to avoid spread of infection discussed.  Will check CBC.  Her previous HIV test in 2014 was negative and she denies risk factors for HIV, so no additional testing ordered.  Encouraged her to establish with ENT " and follow-up precautions given. Patient expressed understanding and agreement with plan.  - CBC WITH DIFFERENTIAL; Future      Return if symptoms worsen or fail to improve.    Please note that this dictation was created using voice recognition software. I have made every reasonable attempt to correct obvious errors, but I expect that there are errors of grammar and possibly content that I did not discover before finalizing the note.

## 2020-10-25 NOTE — PROGRESS NOTES
"Virtual Visit: Established Patient   This visit was conducted via Zoom using secure and encrypted videoconferencing technology. The patient was in a private location in the state of [place].    The patient's identity was confirmed and verbal consent was obtained for this virtual visit.    Subjective:   CC:   Chief Complaint   Patient presents with   • Medication Refill     pt states she would like to increase dose of sertraline       Cheyanne Cavazos is a 30 y.o. female presenting for evaluation and management of:    Moderate episode of recurrent major depressive disorder (HCC)  Pt states she has started to notice she is having more bad and \"blah\" days over the last 4-6 weeks and this is progressively worsening despite sertraline 50mg daily.  No significant negative SEs from sertraline.  States that the pandemic, upcoming election are possibly worsening symptoms.  No home stressors.  States she is only very rarely using 1/4 tablet of the hydroxine prn for anxiety at night time and this medication causes her to feel tired, so it help symptoms well.        ROS   Denies any recent fevers or chills. No nausea or vomiting. No chest pains or shortness of breath.     Allergies   Allergen Reactions   • Amoxicillin        Current medicines (including changes today)  Current Outpatient Medications   Medication Sig Dispense Refill   • sertraline (ZOLOFT) 50 MG Tab Take 1.5 Tabs by mouth every day. 45 Tab 2   • hydrOXYzine HCl (ATARAX) 50 MG Tab Take 1 Tab by mouth 3 times a day as needed for Anxiety. 30 Tab 1   • acetaminophen (TYLENOL) 325 MG TABS Take 650 mg by mouth every four hours as needed.       No current facility-administered medications for this visit.        Patient Active Problem List    Diagnosis Date Noted   • Ulcer of the throat 09/21/2020   • Tongue abnormality 09/21/2020   • Thyromegaly 09/21/2020   • Morbid obesity with BMI of 40.0-44.9, adult ([facility]) 09/21/2020   • Moderate episode of recurrent major depressive " "disorder (HCC) 11/19/2019   • Obesity (BMI 35.0-39.9 without comorbidity) ([facility]) 08/02/2019   • History of intravenous drug use in remission 02/21/2014   • Mood disorder (HCC) 02/21/2014   • Tobacco dependence 02/21/2014       Family History   Problem Relation Age of Onset   • Non-contributory Mother    • Non-contributory Father    • Cancer Paternal Uncle         lung cancer   • Cancer Maternal Grandfather    • Cancer Paternal Grandfather    • Heart Attack Paternal Grandfather         x 2   • Hypertension Maternal Aunt    • Cancer Other         lung cancer, cancer related to alcohol and unknown type of cancer   • Diabetes Neg Hx    • Stroke Neg Hx        She  has a past medical history of Functional ovarian cysts, History of intravenous drug use in remission (2/21/2014), Mood disorder (HCC) (2/21/2014), and Seasonal allergies. She also has no past medical history of Diabetes.  She  has a past surgical history that includes tonsillectomy (1994).       Objective:   Pulse (!) 56 Comment: pt stated  Ht 1.651 m (5' 5\") Comment: pt stated  Wt 108.9 kg (240 lb) Comment: pt stated  LMP 10/17/2020   BMI 39.94 kg/m²     Physical Exam:  Constitutional: Alert, no distress, well-groomed.  Skin: No rashes in visible areas.  Eye: Round. Conjunctiva clear, lids normal. No icterus.   ENMT: Lips pink without lesions, good dentition, moist mucous membranes. Phonation normal.  Neck: No masses, no thyromegaly. Moves freely without pain.  Respiratory: Unlabored respiratory effort, no cough or audible wheeze  Psych: Alert and oriented x3, normal affect and mood. She is sitting comfortably on her couch, smiling, good eye contact, linear speech pattern.  No SI/HI.      Assessment and Plan:   The following treatment plan was discussed:     1. Moderate episode of recurrent major depressive disorder (HCC)  Chronic, worsening over the last couple weeks with stress from COVID19 and upcoming presidential election.  No SI/HI.  Her insurance " does not cover therapy, so will defer  referral but offered to place referral in the future if she desires.  Will increase zoloft.  SE profile discussed and pt to take it with food since it causes her stomach upset if taken on empty stomach.  Dietary and lifestyle modifications discussed. Pt to follow-up in 2 months or sooenr prn. Patient expressed understanding and agreement with plan.  - sertraline (ZOLOFT) 50 MG Tab; Take 1.5 Tabs by mouth every day.  Dispense: 45 Tab; Refill: 2      Follow-up: Return in about 2 months (around 12/26/2020) for follow-up mood.

## 2020-10-26 ENCOUNTER — TELEMEDICINE (OUTPATIENT)
Dept: MEDICAL GROUP | Facility: MEDICAL CENTER | Age: 30
End: 2020-10-26
Payer: COMMERCIAL

## 2020-10-26 VITALS — WEIGHT: 240 LBS | HEART RATE: 56 BPM | BODY MASS INDEX: 39.99 KG/M2 | HEIGHT: 65 IN

## 2020-10-26 DIAGNOSIS — F33.1 MODERATE EPISODE OF RECURRENT MAJOR DEPRESSIVE DISORDER (HCC): ICD-10-CM

## 2020-10-26 PROCEDURE — 99213 OFFICE O/P EST LOW 20 MIN: CPT | Mod: 95,CR | Performed by: FAMILY MEDICINE

## 2020-10-26 ASSESSMENT — PATIENT HEALTH QUESTIONNAIRE - PHQ9
5. POOR APPETITE OR OVEREATING: 1 - SEVERAL DAYS
SUM OF ALL RESPONSES TO PHQ QUESTIONS 1-9: 8
CLINICAL INTERPRETATION OF PHQ2 SCORE: 2

## 2020-10-26 ASSESSMENT — FIBROSIS 4 INDEX: FIB4 SCORE: 0.45

## 2020-10-26 NOTE — ASSESSMENT & PLAN NOTE
"Pt states she has started to notice she is having more bad and \"blah\" days over the last 4-6 weeks and this is progressively worsening despite sertraline 50mg daily.  No significant negative SEs from sertraline.  States that the pandemic, upcoming election are possibly worsening symptoms.  No home stressors.  States she is only very rarely using 1/4 tablet of the hydroxine prn for anxiety at night time and this medication causes her to feel tired, so it help symptoms well.  "

## 2020-11-08 NOTE — PROGRESS NOTES
"Virtual Visit: Established Patient   This visit was conducted via {Platform used:23029::\"Zoom\"} using secure and encrypted videoconferencing technology. The patient was in a private location in the state of {State:[place]::\"[place]\"}.    The patient's identity was confirmed and verbal consent was obtained for this virtual visit.    Subjective:   CC: No chief complaint on file.      Cheyanne Cavazos is a 30 y.o. female presenting for evaluation and management of:    ***    ROS ***  Denies any recent fevers or chills. No nausea or vomiting. No chest pains or shortness of breath.     Allergies   Allergen Reactions   • Amoxicillin        Current medicines (including changes today)  Current Outpatient Medications   Medication Sig Dispense Refill   • sertraline (ZOLOFT) 50 MG Tab Take 1.5 Tabs by mouth every day. 45 Tab 2   • hydrOXYzine HCl (ATARAX) 50 MG Tab Take 1 Tab by mouth 3 times a day as needed for Anxiety. 30 Tab 1   • acetaminophen (TYLENOL) 325 MG TABS Take 650 mg by mouth every four hours as needed.       No current facility-administered medications for this visit.        Patient Active Problem List    Diagnosis Date Noted   • Ulcer of the throat 09/21/2020   • Tongue abnormality 09/21/2020   • Thyromegaly 09/21/2020   • Morbid obesity with BMI of 40.0-44.9, adult ([facility]) 09/21/2020   • Moderate episode of recurrent major depressive disorder ([facility]) 11/19/2019   • Obesity (BMI 35.0-39.9 without comorbidity) ([facility]) 08/02/2019   • History of intravenous drug use in remission 02/21/2014   • Mood disorder ([facility]) 02/21/2014   • Tobacco dependence 02/21/2014       Family History   Problem Relation Age of Onset   • Non-contributory Mother    • Non-contributory Father    • Cancer Paternal Uncle         lung cancer   • Cancer Maternal Grandfather    • Cancer Paternal Grandfather    • Heart Attack Paternal Grandfather         x 2   • Hypertension Maternal Aunt    • Cancer Other         lung cancer, cancer related to alcohol and " unknown type of cancer   • Diabetes Neg Hx    • Stroke Neg Hx        She  has a past medical history of Functional ovarian cysts, History of intravenous drug use in remission (2/21/2014), Mood disorder (HCC) (2/21/2014), and Seasonal allergies. She also has no past medical history of Diabetes.  She  has a past surgical history that includes tonsillectomy (1994).       Objective:   LMP 10/17/2020     Physical Exam:***  Constitutional: Alert, no distress, well-groomed.  Skin: No rashes in visible areas.  Eye: Round. Conjunctiva clear, lids normal. No icterus.   ENMT: Lips pink without lesions, good dentition, moist mucous membranes. Phonation normal.  Neck: No masses, no thyromegaly. Moves freely without pain.  Respiratory: Unlabored respiratory effort, no cough or audible wheeze  Psych: Alert and oriented x3, normal affect and mood.       Assessment and Plan:   The following treatment plan was discussed:     There are no diagnoses linked to this encounter.      Follow-up: No follow-ups on file.

## 2020-11-09 ENCOUNTER — APPOINTMENT (OUTPATIENT)
Dept: MEDICAL GROUP | Facility: MEDICAL CENTER | Age: 30
End: 2020-11-09
Payer: COMMERCIAL

## 2021-02-15 ENCOUNTER — OFFICE VISIT (OUTPATIENT)
Dept: URGENT CARE | Facility: PHYSICIAN GROUP | Age: 31
End: 2021-02-15
Payer: COMMERCIAL

## 2021-02-15 ENCOUNTER — HOSPITAL ENCOUNTER (OUTPATIENT)
Facility: MEDICAL CENTER | Age: 31
End: 2021-02-15
Attending: PHYSICIAN ASSISTANT
Payer: COMMERCIAL

## 2021-02-15 VITALS
RESPIRATION RATE: 12 BRPM | BODY MASS INDEX: 39.99 KG/M2 | OXYGEN SATURATION: 95 % | TEMPERATURE: 98.1 F | SYSTOLIC BLOOD PRESSURE: 104 MMHG | HEIGHT: 65 IN | DIASTOLIC BLOOD PRESSURE: 72 MMHG | HEART RATE: 56 BPM | WEIGHT: 240 LBS

## 2021-02-15 DIAGNOSIS — K12.1 ULCERATION OF ORAL MUCOSA: ICD-10-CM

## 2021-02-15 DIAGNOSIS — J01.00 ACUTE MAXILLARY SINUSITIS, RECURRENCE NOT SPECIFIED: ICD-10-CM

## 2021-02-15 DIAGNOSIS — K12.1 ORAL ULCER: ICD-10-CM

## 2021-02-15 DIAGNOSIS — Z86.19 HISTORY OF CANDIDIASIS: ICD-10-CM

## 2021-02-15 PROCEDURE — 99213 OFFICE O/P EST LOW 20 MIN: CPT | Performed by: PHYSICIAN ASSISTANT

## 2021-02-15 RX ORDER — FLUCONAZOLE 150 MG/1
150 TABLET ORAL DAILY
Qty: 2 TABLET | Refills: 0 | Status: SHIPPED | OUTPATIENT
Start: 2021-02-15 | End: 2021-02-17

## 2021-02-15 RX ORDER — CEFDINIR 300 MG/1
300 CAPSULE ORAL 2 TIMES DAILY
Qty: 20 CAPSULE | Refills: 0 | Status: SHIPPED | OUTPATIENT
Start: 2021-02-15 | End: 2021-02-25

## 2021-02-15 ASSESSMENT — FIBROSIS 4 INDEX: FIB4 SCORE: 0.45

## 2021-02-16 DIAGNOSIS — K12.1 ULCERATION OF ORAL MUCOSA: ICD-10-CM

## 2021-02-16 DIAGNOSIS — J01.00 ACUTE MAXILLARY SINUSITIS, RECURRENCE NOT SPECIFIED: ICD-10-CM

## 2021-02-16 DIAGNOSIS — K12.1 ORAL ULCER: ICD-10-CM

## 2021-02-16 DIAGNOSIS — Z86.19 HISTORY OF CANDIDIASIS: ICD-10-CM

## 2021-02-16 LAB
FORWARD REASON: SPWHY: NORMAL
FORWARDED TO LAB: SPWHR: NORMAL
SPECIMEN SENT: SPWT1: NORMAL

## 2021-02-18 ASSESSMENT — ENCOUNTER SYMPTOMS
SINUS PRESSURE: 1
STRIDOR: 0
HOARSE VOICE: 0
FEVER: 0
SPUTUM PRODUCTION: 0
SORE THROAT: 0
WHEEZING: 0
COUGH: 0
SHORTNESS OF BREATH: 0
SINUS PAIN: 1
CHILLS: 0
HEADACHES: 1
SWOLLEN GLANDS: 0

## 2021-02-18 NOTE — PROGRESS NOTES
Subjective:      Cheyanne Cavazos is a 30 y.o. female who presents with Sinus Problem (Drainage, sinus pressure, bloody mucus onset 2 weeks)    Medications:    • acetaminophen Tabs  • hydrOXYzine HCl Tabs  • sertraline Tabs    Allergies: Amoxicillin    Problem List: Cheyanne Cavazos has History of intravenous drug use in remission; Mood disorder (HCC); Tobacco dependence; Obesity (BMI 35.0-39.9 without comorbidity) (HCC); Moderate episode of recurrent major depressive disorder (HCC); Ulcer of the throat; Tongue abnormality; Thyromegaly; and Morbid obesity with BMI of 40.0-44.9, adult (HCC) on their problem list.    Surgical History:  Past Surgical History:   Procedure Laterality Date   • TONSILLECTOMY  1994       Past Social Hx: Cheyanne Cavazos  reports that she quit smoking about 5 years ago. She started smoking about 15 years ago. She has a 4.00 pack-year smoking history. She has never used smokeless tobacco. She reports current alcohol use. She reports that she does not use drugs.     Past Family Hx:  Cheyanne Cavazos family history includes Cancer in her maternal grandfather, paternal grandfather, paternal uncle, and another family member; Heart Attack in her paternal grandfather; Hypertension in her maternal aunt; Non-contributory in her father and mother.     Problem list, medications, and allergies reviewed by myself today in Epic.           Patient presents with:  Sinus Problem: Drainage, sinus pressure, sinus headache, bloody mucus onset 2 weeks. Pt also has a couple of sores in her mouth that she would like to have cultured for HSV.  Pt denies cough, sob, n/v/d or fever.          Sinus Problem  This is a new problem. The current episode started 1 to 4 weeks ago. The problem has been gradually worsening since onset. There has been no fever. Her pain is at a severity of 7/10. Associated symptoms include congestion, headaches and sinus pressure. Pertinent negatives include no chills, coughing, ear pain, hoarse  "voice, shortness of breath, sneezing, sore throat or swollen glands. Past treatments include saline sprays, acetaminophen and oral decongestants. The treatment provided no relief.       Review of Systems   Constitutional: Negative for chills, fever and malaise/fatigue.   HENT: Positive for congestion, sinus pressure and sinus pain. Negative for ear pain, hoarse voice, sneezing and sore throat.    Respiratory: Negative for cough, sputum production, shortness of breath, wheezing and stridor.    Neurological: Positive for headaches.   All other systems reviewed and are negative.         Objective:     /72   Pulse (!) 56   Temp 36.7 °C (98.1 °F)   Resp 12   Ht 1.651 m (5' 5\")   Wt 109 kg (240 lb)   LMP 02/01/2021 (Exact Date)   SpO2 95%   BMI 39.94 kg/m²      Physical Exam  Vitals and nursing note reviewed.   Constitutional:       General: She is not in acute distress.     Appearance: Normal appearance. She is well-developed. She is obese. She is not ill-appearing or toxic-appearing.   HENT:      Head: Normocephalic and atraumatic.      Right Ear: Tympanic membrane normal.      Left Ear: Tympanic membrane normal.      Nose: Mucosal edema, congestion and rhinorrhea present.      Right Sinus: Maxillary sinus tenderness present.      Left Sinus: Maxillary sinus tenderness present.      Mouth/Throat:      Lips: Pink.      Mouth: Mucous membranes are moist.      Pharynx: Uvula midline. No oropharyngeal exudate or posterior oropharyngeal erythema.     Eyes:      Extraocular Movements: Extraocular movements intact.      Conjunctiva/sclera: Conjunctivae normal.      Pupils: Pupils are equal, round, and reactive to light.   Cardiovascular:      Rate and Rhythm: Normal rate and regular rhythm.      Pulses: Normal pulses.      Heart sounds: Normal heart sounds.   Pulmonary:      Effort: Pulmonary effort is normal. No respiratory distress.      Breath sounds: Normal breath sounds.   Abdominal:      Palpations: Abdomen " is soft.   Musculoskeletal:         General: Normal range of motion.      Cervical back: Normal range of motion and neck supple.   Lymphadenopathy:      Cervical: No cervical adenopathy.   Skin:     General: Skin is warm and dry.      Capillary Refill: Capillary refill takes less than 2 seconds.   Neurological:      General: No focal deficit present.      Mental Status: She is alert and oriented to person, place, and time.      Gait: Gait normal.   Psychiatric:         Mood and Affect: Mood normal.                 Assessment/Plan:         1. Acute maxillary sinusitis, recurrence not specified  cefdinir (OMNICEF) 300 MG Cap    fluconazole (DIFLUCAN) 150 MG tablet    HSV 1/2 By PCR(Herpes)+[id number]   2. Oral ulcer  cefdinir (OMNICEF) 300 MG Cap    fluconazole (DIFLUCAN) 150 MG tablet    HSV 1/2 By PCR(Herpes)+[id number]   3. Ulceration of oral mucosa  cefdinir (OMNICEF) 300 MG Cap    fluconazole (DIFLUCAN) 150 MG tablet    HSV 1/2 By PCR(Herpes)+[id number]   4. History of candidiasis  cefdinir (OMNICEF) 300 MG Cap    fluconazole (DIFLUCAN) 150 MG tablet    HSV 1/2 By PCR(Herpes)+[id number]     Patient was evaluated in clinic today while wearing appropriate personal protective equipment.      Pt declined COVID test today.     Rx omnicef for sinusitis, pt has tolerated cephalosporins in past without issues , (pt is unsure what her amoxicillin allergy actually is).    Motrin/Advil/Ibuprophen 600 mg every 6 hours as needed for pain or fever, tylenol 500mg per dose if pt prefers tylenol.    PT should follow up with PCP in 1-2 days for re-evaluation if symptoms have not improved.      Discussed red flags and reasons to return to UC or ED.      Pt and/or family verbalized understanding of diagnosis and follow up instructions and was offered informational handout on diagnosis.  PT discharged.

## 2021-02-22 ENCOUNTER — HOSPITAL ENCOUNTER (OUTPATIENT)
Facility: MEDICAL CENTER | Age: 31
End: 2021-02-22
Attending: PHYSICIAN ASSISTANT
Payer: COMMERCIAL

## 2021-02-22 ENCOUNTER — OFFICE VISIT (OUTPATIENT)
Dept: MEDICAL GROUP | Age: 31
End: 2021-02-22
Payer: COMMERCIAL

## 2021-02-22 VITALS
OXYGEN SATURATION: 98 % | TEMPERATURE: 96.4 F | SYSTOLIC BLOOD PRESSURE: 128 MMHG | HEIGHT: 65 IN | BODY MASS INDEX: 41.82 KG/M2 | DIASTOLIC BLOOD PRESSURE: 88 MMHG | WEIGHT: 251 LBS | HEART RATE: 69 BPM

## 2021-02-22 DIAGNOSIS — Z72.51 HIGH RISK SEXUAL BEHAVIOR, UNSPECIFIED TYPE: ICD-10-CM

## 2021-02-22 DIAGNOSIS — E66.01 MORBID OBESITY WITH BMI OF 40.0-44.9, ADULT (HCC): ICD-10-CM

## 2021-02-22 DIAGNOSIS — Z11.3 SCREEN FOR STD (SEXUALLY TRANSMITTED DISEASE): ICD-10-CM

## 2021-02-22 DIAGNOSIS — F33.1 MODERATE EPISODE OF RECURRENT MAJOR DEPRESSIVE DISORDER (HCC): ICD-10-CM

## 2021-02-22 PROBLEM — E66.9 OBESITY (BMI 35.0-39.9 WITHOUT COMORBIDITY): Status: RESOLVED | Noted: 2019-08-02 | Resolved: 2021-02-22

## 2021-02-22 LAB
FORWARD REASON: SPWHY: NORMAL
FORWARDED TO LAB: SPWHR: NORMAL
SPECIMEN SENT: SPWT1: NORMAL

## 2021-02-22 PROCEDURE — 99214 OFFICE O/P EST MOD 30 MIN: CPT | Performed by: PHYSICIAN ASSISTANT

## 2021-02-22 ASSESSMENT — FIBROSIS 4 INDEX: FIB4 SCORE: 0.45

## 2021-02-22 NOTE — ASSESSMENT & PLAN NOTE
She has started to exercise more regularly.  She has a stationary bike at home and has been writing for 25-30 minutes 2-3 times a week.  She states her diet is still fairly poor.  She is trying to more, however does enjoy sweets and also eats fast food.  Denies dizziness, chest pain, palpitations, shortness of breath.

## 2021-02-22 NOTE — ASSESSMENT & PLAN NOTE
She is currently on 75 mg of Zoloft.  This was increased about 3 months ago.  She states she noted a good improvement in her depressive symptoms.  She would like to continue on current dose.  She does have Atarax, which she uses very rarely.  Denies SI or HI

## 2021-02-22 NOTE — PROGRESS NOTES
cc: Establish care    Subjective:     HPI  Cheyanne Cavazos is a 30 y.o. female presenting to [facility].  Is been about 4 months since she has been seen by primary care provider.  She works as a [employer].  She is followed by gynecology and is up-to-date on her Pap.  She is currently not on birth control.  She is trying for pregnancy.    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  States she has never had cold sores or genital herpes.  Mom does have oral herpes.  She has been in a monogamous relationship for 5 years.  However, she would still like to be tested for STDs.  Denies vesicles, sores, genital pain.    Morbid obesity with BMI of 40.0-44.9, adult (HCC)  She has started to exercise more regularly.  She has a stationary bike at home and has been writing for 25-30 minutes 2-3 times a week.  She states her diet is still fairly poor.  She is trying to more, however does enjoy sweets and also eats fast food.  Denies dizziness, chest pain, palpitations, shortness of breath.    Moderate episode of recurrent major depressive disorder (HCC)  She is currently on 75 mg of Zoloft.  This was increased about 3 months ago.  She states she noted a good improvement in her depressive symptoms.  She would like to continue on current dose.  She does have Atarax, which she uses very rarely.  Denies SI or HI      Review of systems:  See above.       Current Outpatient Medications:   •  sertraline (ZOLOFT) 50 MG Tab, Take 1.5 Tablets by mouth every day for 90 days., Disp: 135 tablet, Rfl: 3  •  cefdinir (OMNICEF) 300 MG Cap, Take 1 capsule by mouth 2 times a day for 10 days., Disp: 20 capsule, Rfl: 0  •  acetaminophen (TYLENOL) 325 MG TABS, Take 650 mg by mouth every four hours as needed., Disp: , Rfl:   •  hydrOXYzine HCl (ATARAX) 50 MG Tab, Take 1 Tab by mouth 3 times a day as needed for Anxiety. (Patient not taking: Reported on 2/15/2021), Disp: 30 Tab, Rfl: 1    Allergies, past medical history,  "past surgical history, family history, social history reviewed and updated    Objective:     Vitals: /88 (BP Location: Left arm, Patient Position: Sitting, BP Cuff Size: Adult)   Pulse 69   Temp (!) 35.8 °C (96.4 °F) (Temporal)   Ht 1.651 m (5' 5\")   Wt 114 kg (251 lb)   LMP 02/01/2021 (Exact Date)   SpO2 98%   Breastfeeding No   BMI 41.77 kg/m²   General: Alert, pleasant, NAD  HEENT: Normocephalic. Neck supple.  No thyromegaly or masses palpated. No cervical or supraclavicular lymphadenopathy. No carotid bruits   Heart: Regular rate and rhythm.  S1 and S2 normal.  No murmurs appreciated.  Respiratory: Normal respiratory effort.  Clear to auscultation bilaterally.  Skin: Warm, dry, no rashes.  Extremities: No leg edema.  Radial pulses 2+ symmetric  Psych:  Affect/mood is normal, judgement is good, memory is intact, grooming is appropriate.    Assessment/Plan:     Cheyanne was seen today for establish care.    Diagnoses and all orders for this visit:    Morbid obesity with BMI of 40.0-44.9, adult (HCC)  -She is working on increasing physical activity.  Reviewed better control diet.  Will check below labs.  Further treatment as needed pending results  -     Comp Metabolic Panel; Future  -     Lipid Profile; Future    Moderate episode of recurrent major depressive disorder (HCC)  -Controlled.  Continue 75 mg of Zoloft.  Did discuss when she becomes pregnant, we should consider tapering off of Zoloft if it is tolerable.  -     CBC WITH DIFFERENTIAL; Future  -     sertraline (ZOLOFT) 50 MG Tab; Take 1.5 Tablets by mouth every day for 90 days.    Screen for STD (sexually transmitted disease)  -Discussed diagnosis of HSV 2.  Patient states she has never had any symptoms.  Will screen for below STDs.  -     Chlamydia/GC PCR Urine Or Swab; Future  -     HIV AG/AB COMBO ASSAY SCREENING; Future  -     T.PALLIDUM AB EIA; Future  -     HEPATITIS PANEL ACUTE(4 COMPONENTS); Future    High risk sexual behavior, " unspecified type  -     Chlamydia/GC PCR Urine Or Swab; Future  -     HIV AG/AB COMBO ASSAY SCREENING; Future  -     T.PALLIDUM AB EIA; Future  -     HEPATITIS PANEL ACUTE(4 COMPONENTS); Future        Return in about 6 weeks (around 4/5/2021) for Annual PX, Lab Review.

## 2021-03-02 ENCOUNTER — TELEPHONE (OUTPATIENT)
Dept: MEDICAL GROUP | Age: 31
End: 2021-03-02

## 2021-03-02 DIAGNOSIS — Z72.51 HIGH RISK SEXUAL BEHAVIOR, UNSPECIFIED TYPE: ICD-10-CM

## 2021-03-02 DIAGNOSIS — Z11.3 SCREEN FOR STD (SEXUALLY TRANSMITTED DISEASE): ICD-10-CM

## 2021-03-02 NOTE — TELEPHONE ENCOUNTER
Phone Number Called: [phone] (home)       Call outcome: Spoke to patient regarding message below.    Message: Spoke with pt and informed her that she needs to redo her labs for chlamydia/GC and take her order to [facility] so they can process it. Pt will come in to pickup order. Left print out copy at pt pickup.

## 2021-03-02 NOTE — TELEPHONE ENCOUNTER
1. Caller Name: [facility]                     Call Back Number: [phone]    Received letter from [facility] stating that the specimen submitted did not meet specimen requirements and test was not performed. Pt is needing a new order to take to [facility] so she can get this lab done. Please advise.

## 2021-04-29 DIAGNOSIS — Z11.3 SCREEN FOR STD (SEXUALLY TRANSMITTED DISEASE): ICD-10-CM

## 2021-04-29 DIAGNOSIS — Z72.51 HIGH RISK SEXUAL BEHAVIOR, UNSPECIFIED TYPE: ICD-10-CM

## 2022-03-14 DIAGNOSIS — Z00.00 BLOOD TESTS FOR ROUTINE GENERAL PHYSICAL EXAMINATION: ICD-10-CM

## 2022-03-14 DIAGNOSIS — F33.1 MODERATE EPISODE OF RECURRENT MAJOR DEPRESSIVE DISORDER (HCC): ICD-10-CM

## 2022-03-14 DIAGNOSIS — E66.01 MORBID OBESITY WITH BMI OF 40.0-44.9, ADULT (HCC): ICD-10-CM

## 2023-04-28 ENCOUNTER — OFFICE VISIT (OUTPATIENT)
Dept: MEDICAL GROUP | Facility: MEDICAL CENTER | Age: 33
End: 2023-04-28
Payer: COMMERCIAL

## 2023-04-28 VITALS
HEART RATE: 72 BPM | DIASTOLIC BLOOD PRESSURE: 90 MMHG | BODY MASS INDEX: 40.55 KG/M2 | OXYGEN SATURATION: 97 % | WEIGHT: 243.4 LBS | TEMPERATURE: 95.3 F | SYSTOLIC BLOOD PRESSURE: 130 MMHG | HEIGHT: 65 IN

## 2023-04-28 DIAGNOSIS — Z13.1 SCREENING FOR DIABETES MELLITUS: ICD-10-CM

## 2023-04-28 DIAGNOSIS — B00.9 HSV INFECTION: ICD-10-CM

## 2023-04-28 DIAGNOSIS — F33.1 MODERATE EPISODE OF RECURRENT MAJOR DEPRESSIVE DISORDER (HCC): ICD-10-CM

## 2023-04-28 DIAGNOSIS — F17.200 TOBACCO DEPENDENCE: ICD-10-CM

## 2023-04-28 DIAGNOSIS — E66.01 MORBID OBESITY WITH BMI OF 40.0-44.9, ADULT (HCC): ICD-10-CM

## 2023-04-28 DIAGNOSIS — F41.9 ANXIETY: ICD-10-CM

## 2023-04-28 DIAGNOSIS — Z12.4 CERVICAL CANCER SCREENING: ICD-10-CM

## 2023-04-28 DIAGNOSIS — Z13.220 ENCOUNTER FOR LIPID SCREENING FOR CARDIOVASCULAR DISEASE: ICD-10-CM

## 2023-04-28 DIAGNOSIS — Z13.0 SCREENING FOR DEFICIENCY ANEMIA: ICD-10-CM

## 2023-04-28 DIAGNOSIS — Z13.6 ENCOUNTER FOR LIPID SCREENING FOR CARDIOVASCULAR DISEASE: ICD-10-CM

## 2023-04-28 PROBLEM — E01.0 THYROMEGALY: Status: RESOLVED | Noted: 2020-09-21 | Resolved: 2023-04-28

## 2023-04-28 PROBLEM — J38.7: Status: RESOLVED | Noted: 2020-09-21 | Resolved: 2023-04-28

## 2023-04-28 PROBLEM — Q38.3 TONGUE ABNORMALITY: Status: RESOLVED | Noted: 2020-09-21 | Resolved: 2023-04-28

## 2023-04-28 PROCEDURE — 99214 OFFICE O/P EST MOD 30 MIN: CPT | Performed by: FAMILY MEDICINE

## 2023-04-28 RX ORDER — HYDROXYZINE HYDROCHLORIDE 10 MG/1
50 TABLET, FILM COATED ORAL 3 TIMES DAILY PRN
Qty: 90 TABLET | Refills: 3 | Status: SHIPPED | OUTPATIENT
Start: 2023-04-28 | End: 2023-05-02 | Stop reason: SDUPTHER

## 2023-04-28 RX ORDER — SERTRALINE HYDROCHLORIDE 100 MG/1
100 TABLET, FILM COATED ORAL DAILY
Qty: 90 TABLET | Refills: 3 | Status: SHIPPED | OUTPATIENT
Start: 2023-04-28

## 2023-04-28 ASSESSMENT — ENCOUNTER SYMPTOMS
PALPITATIONS: 0
ABDOMINAL PAIN: 0
FEVER: 0
DIZZINESS: 0
WEIGHT LOSS: 0
COUGH: 0
CHILLS: 0
DEPRESSION: 1
WEAKNESS: 0
SHORTNESS OF BREATH: 0
MYALGIAS: 0

## 2023-04-28 ASSESSMENT — PATIENT HEALTH QUESTIONNAIRE - PHQ9
5. POOR APPETITE OR OVEREATING: 1 - SEVERAL DAYS
CLINICAL INTERPRETATION OF PHQ2 SCORE: 1
SUM OF ALL RESPONSES TO PHQ QUESTIONS 1-9: 4

## 2023-04-28 NOTE — ASSESSMENT & PLAN NOTE
Chronic, stable.  Recommended to reach out to her pharmacy to discuss semaglutide as a potential option.  When she returns for labs, we will discuss [facility] as an additional resource that she can use to help with weight loss.

## 2023-04-28 NOTE — PROGRESS NOTES
Cheyanne Cavazos is a pleasant 33 y.o. female here to establish care.    HPI:   Problem   Hsv Infection    Have a throat infection in which she had cultured.  She was positive for HSV in the throat.  Has only had that one flareup, has not had any further flareups.  Does not have any antivirals on hand in the event that she does get a flareup.     Morbid Obesity With Bmi of 40.0-44.9, Adult ([facility])    Interested in weight loss program or potentially getting started on medication to help her with weight reduction.  She is interested in Ozempic, would like to avoid phentermine as she noticed that people would be able to lose weight but then the weight gain significant amount of weight back once he stopped the medication.     Moderate Episode of Recurrent Major Depressive Disorder ([facility])    History of depression in which she takes sertraline 100 mg daily for.  She is requesting a refill of this medication.  Feels her depression is relatively stable.    Depression Screening    Little interest or pleasure in doing things?  1 - several days   Feeling down, depressed , or hopeless? 0 - not at all   Trouble falling or staying asleep, or sleeping too much?  1 - several days   Feeling tired or having little energy?  1 - several days   Poor appetite or overeating?  1 - several days   Feeling bad about yourself - or that you are a failure or have let yourself or your family down? 0 - not at all   Trouble concentrating on things, such as reading the newspaper or watching television? 0 - not at all   Moving or speaking so slowly that other people could have noticed.  Or the opposite - being so fidgety or restless that you have been moving around a lot more than usual?  0 - not at all   Thoughts that you would be better off dead, or of hurting yourself?  0 - not at all   Patient Health Questionnaire Score: 4       If depressive symptoms identified deferred to follow up visit unless specifically addressed in assesment and  plan.    Interpretation of PHQ-9 Total Score   Score Severity   1-4 No Depression   5-9 Mild Depression   10-14 Moderate Depression   15-19 Moderately Severe Depression   20-27 Severe Depression     Tobacco Dependence    10-year history of smoking half a pack a day.  Switched over to vaping.  Working on trying to cut nicotine altogether, currently vaping every other day, using disposable with flavoring.     Ulcer of The Throat (Resolved)   Tongue Abnormality (Resolved)   Thyromegaly (Resolved)   History of Intravenous Drug Use in Remission (Resolved)          Current medicines (including changes today)  Current Outpatient Medications   Medication Sig Dispense Refill    sertraline (ZOLOFT) 100 MG Tab Take 1 Tablet by mouth every day. 90 Tablet 3    Multiple Vitamins-Minerals (WOMENS MULTI PO) Take  by mouth.      hydrOXYzine HCl (ATARAX) 10 MG Tab Take 5 Tablets by mouth 3 times a day as needed for Anxiety. 90 Tablet 3    acetaminophen (TYLENOL) 325 MG TABS Take 650 mg by mouth every four hours as needed.       No current facility-administered medications for this visit.       Past Medical/ Surgical History  She  has a past medical history of Functional ovarian cysts, History of intravenous drug use in remission (2014), History of intravenous drug use in remission (2014), Mood disorder (HCC) (2014), and Seasonal allergies.    She has no past medical history of Diabetes.  She  has a past surgical history that includes tonsillectomy ().    Social History  Social History     Tobacco Use    Smoking status: Former     Packs/day: 0.50     Years: 10.00     Pack years: 5.00     Types: Cigarettes     Start date: 2006     Quit date: 2016     Years since quittin.3    Smokeless tobacco: Never   Vaping Use    Vaping Use: Some days    Start date: 2021    Substances: Nicotine, Flavoring    Devices: Disposable    Passive vaping exposure: Yes   Substance Use Topics    Alcohol use: Yes      "Alcohol/week: 1.2 oz     Types: 2 Standard drinks or equivalent per week     Comment: occ    Drug use: No     Types: IV     Comment: hx of heroin      Social History     Social History Narrative    Not on file        Family History  Family History   Problem Relation Age of Onset    Colorectal Cancer Mother     Cancer Mother     Non-contributory Father     Hypertension Maternal Aunt     Cancer Paternal Uncle         lung cancer    Cancer Maternal Grandfather     Cancer Paternal Grandfather     Heart Attack Paternal Grandfather         x 2    Cancer Other         lung cancer, cancer related to alcohol and unknown type of cancer    Diabetes Neg Hx     Stroke Neg Hx      Family Status   Relation Name Status    Mo  Alive    Fa  Alive    MAunt  (Not Specified)    PUnc   at age 45        lung cancer    MGMo      MGFa      PGMo      PGFa      OTHER  (Not Specified)    Neg Hx  (Not Specified)         Review of Systems   Constitutional:  Negative for chills, fever, malaise/fatigue and weight loss.   Respiratory:  Negative for cough and shortness of breath.    Cardiovascular:  Negative for chest pain and palpitations.   Gastrointestinal:  Negative for abdominal pain.   Genitourinary: Negative.    Musculoskeletal:  Negative for myalgias.   Skin:  Negative for rash.   Neurological:  Negative for dizziness and weakness.   Psychiatric/Behavioral:  Positive for depression (stable on medication).        Objective:     BP (!) 130/90 (BP Location: Left arm, Patient Position: Sitting, BP Cuff Size: Large adult)   Pulse 72   Temp (!) 35.2 °C (95.3 °F) (Temporal)   Ht 1.651 m (5' 5\")   Wt 110 kg (243 lb 6.4 oz)   SpO2 97%  Body mass index is 40.5 kg/m².    Physical Exam  Constitutional:       General: She is not in acute distress.  HENT:      Head: Normocephalic and atraumatic.      Right Ear: Tympanic membrane and external ear normal.      Left Ear: Tympanic membrane and external ear normal. "      Nose: No nasal deformity.      Mouth/Throat:      Lips: Pink.      Mouth: Mucous membranes are moist.      Pharynx: Oropharynx is clear. Uvula midline. No posterior oropharyngeal erythema.   Eyes:      General: Lids are normal.      Extraocular Movements: Extraocular movements intact.      Conjunctiva/sclera: Conjunctivae normal.      Pupils: Pupils are equal, round, and reactive to light.   Neck:      Trachea: Trachea normal.   Cardiovascular:      Rate and Rhythm: Normal rate and regular rhythm.      Heart sounds: Normal heart sounds. No murmur heard.    No friction rub. No gallop.   Pulmonary:      Effort: Pulmonary effort is normal. No accessory muscle usage.      Breath sounds: Normal breath sounds. No wheezing or rales.   Abdominal:      General: Bowel sounds are normal.      Palpations: Abdomen is soft.      Tenderness: There is no abdominal tenderness.   Musculoskeletal:      Cervical back: Normal range of motion and neck supple.      Right lower leg: No edema.      Left lower leg: No edema.   Lymphadenopathy:      Cervical: No cervical adenopathy.   Skin:     General: Skin is warm and dry.      Findings: No rash.   Neurological:      General: No focal deficit present.      Mental Status: She is alert and oriented to person, place, and time. Mental status is at baseline.      GCS: GCS eye subscore is 4. GCS verbal subscore is 5. GCS motor subscore is 6.      Motor: No weakness.      Gait: Gait is intact.   Psychiatric:         Attention and Perception: Attention normal.         Mood and Affect: Mood and affect normal.         Speech: Speech normal.        Imaging:  No imaging    Labs:  No recent labs  Assessment and Plan:   The following treatment plan was discussed     Problem List Items Addressed This Visit       Tobacco dependence     Chronic, improving.  Recommended to continue to reduce the amount of vaping nicotine that you take daily habit.         Moderate episode of recurrent major depressive  disorder (HCC)     Chronic, stable.  Continue sertraline 100 mg daily, refill sent to her preferred pharmacy.         Relevant Medications    sertraline (ZOLOFT) 100 MG Tab    hydrOXYzine HCl (ATARAX) 10 MG Tab    Morbid obesity with BMI of 40.0-44.9, adult (HCC)     Chronic, stable.  Recommended to reach out to her pharmacy to discuss semaglutide as a potential option.  When she returns for labs, we will discuss [facility] as an additional resource that she can use to help with weight loss.         Relevant Orders    Patient identified as having weight management issue.  Appropriate orders and counseling given.    HSV infection     Stable.  Recommended to reach out if she starts to get a flareup we can prescribe first some valacyclovir.          Other Visit Diagnoses       Anxiety        Relevant Medications    sertraline (ZOLOFT) 100 MG Tab    hydrOXYzine HCl (ATARAX) 10 MG Tab    Cervical cancer screening        Relevant Orders    Referral to Gynecology    Screening for deficiency anemia        Relevant Orders    CBC WITH DIFFERENTIAL    Screening for diabetes mellitus        Relevant Orders    Comp Metabolic Panel    ESTIMATED GFR    Encounter for lipid screening for cardiovascular disease        Relevant Orders    Lipid Profile          Followup: Return in about 4 weeks (around 5/26/2023), or if symptoms worsen or fail to improve, for labs .      Please note that this dictation was created using voice recognition software. I have made every reasonable attempt to correct obvious errors, but I expect that there are errors of grammar and possibly content that I did not discover before finalizing the note.

## 2023-04-28 NOTE — ASSESSMENT & PLAN NOTE
Chronic, improving.  Recommended to continue to reduce the amount of vaping nicotine that you take daily habit.

## 2023-04-28 NOTE — ASSESSMENT & PLAN NOTE
Stable.  Recommended to reach out if she starts to get a flareup we can prescribe first some valacyclovir.

## 2023-05-02 DIAGNOSIS — F33.1 MODERATE EPISODE OF RECURRENT MAJOR DEPRESSIVE DISORDER (HCC): ICD-10-CM

## 2023-05-02 DIAGNOSIS — F41.9 ANXIETY: ICD-10-CM

## 2023-05-02 RX ORDER — HYDROXYZINE HYDROCHLORIDE 10 MG/1
10 TABLET, FILM COATED ORAL 3 TIMES DAILY PRN
Qty: 90 TABLET | Refills: 3 | Status: SHIPPED | OUTPATIENT
Start: 2023-05-02

## 2023-05-02 NOTE — TELEPHONE ENCOUNTER
Received request via: Pharmacy    Was the patient seen in the last year in this department? Yes    Does the patient have an active prescription (recently filled or refills available) for medication(s) requested? No    Does the patient have [facility] and need 100 day supply (blood pressure, diabetes and cholesterol meds only)? Patient does not have SCP      Prior Rx cancelled. Error in SIG.     Faby Murillo  Senior Medical assistant

## 2023-05-15 ENCOUNTER — APPOINTMENT (OUTPATIENT)
Dept: MEDICAL GROUP | Facility: MEDICAL CENTER | Age: 33
End: 2023-05-15
Payer: COMMERCIAL

## 2023-06-05 ENCOUNTER — APPOINTMENT (OUTPATIENT)
Dept: MEDICAL GROUP | Facility: MEDICAL CENTER | Age: 33
End: 2023-06-05
Payer: COMMERCIAL

## 2023-12-13 ENCOUNTER — HOSPITAL ENCOUNTER (EMERGENCY)
Facility: MEDICAL CENTER | Age: 33
End: 2023-12-13
Attending: EMERGENCY MEDICINE
Payer: COMMERCIAL

## 2023-12-13 ENCOUNTER — APPOINTMENT (OUTPATIENT)
Dept: RADIOLOGY | Facility: MEDICAL CENTER | Age: 33
End: 2023-12-13
Attending: EMERGENCY MEDICINE
Payer: COMMERCIAL

## 2023-12-13 VITALS
HEIGHT: 65 IN | DIASTOLIC BLOOD PRESSURE: 79 MMHG | BODY MASS INDEX: 41.43 KG/M2 | WEIGHT: 248.68 LBS | SYSTOLIC BLOOD PRESSURE: 130 MMHG | OXYGEN SATURATION: 100 % | RESPIRATION RATE: 16 BRPM | TEMPERATURE: 97.2 F | HEART RATE: 55 BPM

## 2023-12-13 DIAGNOSIS — R10.13 EPIGASTRIC ABDOMINAL PAIN: ICD-10-CM

## 2023-12-13 LAB
ALBUMIN SERPL BCP-MCNC: 3.7 G/DL (ref 3.2–4.9)
ALBUMIN/GLOB SERPL: 1.3 G/DL
ALP SERPL-CCNC: 59 U/L (ref 30–99)
ALT SERPL-CCNC: 17 U/L (ref 2–50)
ANION GAP SERPL CALC-SCNC: 10 MMOL/L (ref 7–16)
AST SERPL-CCNC: 11 U/L (ref 12–45)
BASOPHILS # BLD AUTO: 0.2 % (ref 0–1.8)
BASOPHILS # BLD: 0.01 K/UL (ref 0–0.12)
BILIRUB SERPL-MCNC: <0.2 MG/DL (ref 0.1–1.5)
BUN SERPL-MCNC: 10 MG/DL (ref 8–22)
CALCIUM ALBUM COR SERPL-MCNC: 8.7 MG/DL (ref 8.5–10.5)
CALCIUM SERPL-MCNC: 8.5 MG/DL (ref 8.5–10.5)
CHLORIDE SERPL-SCNC: 106 MMOL/L (ref 96–112)
CO2 SERPL-SCNC: 23 MMOL/L (ref 20–33)
CREAT SERPL-MCNC: 0.61 MG/DL (ref 0.5–1.4)
EOSINOPHIL # BLD AUTO: 0.09 K/UL (ref 0–0.51)
EOSINOPHIL NFR BLD: 1.5 % (ref 0–6.9)
ERYTHROCYTE [DISTWIDTH] IN BLOOD BY AUTOMATED COUNT: 46.5 FL (ref 35.9–50)
GFR SERPLBLD CREATININE-BSD FMLA CKD-EPI: 120 ML/MIN/1.73 M 2
GLOBULIN SER CALC-MCNC: 2.8 G/DL (ref 1.9–3.5)
GLUCOSE SERPL-MCNC: 102 MG/DL (ref 65–99)
HCG SERPL QL: NEGATIVE
HCT VFR BLD AUTO: 37.5 % (ref 37–47)
HGB BLD-MCNC: 11.9 G/DL (ref 12–16)
IMM GRANULOCYTES # BLD AUTO: 0.02 K/UL (ref 0–0.11)
IMM GRANULOCYTES NFR BLD AUTO: 0.3 % (ref 0–0.9)
LIPASE SERPL-CCNC: 36 U/L (ref 11–82)
LYMPHOCYTES # BLD AUTO: 1.95 K/UL (ref 1–4.8)
LYMPHOCYTES NFR BLD: 32.1 % (ref 22–41)
MCH RBC QN AUTO: 27.4 PG (ref 27–33)
MCHC RBC AUTO-ENTMCNC: 31.7 G/DL (ref 32.2–35.5)
MCV RBC AUTO: 86.2 FL (ref 81.4–97.8)
MONOCYTES # BLD AUTO: 0.45 K/UL (ref 0–0.85)
MONOCYTES NFR BLD AUTO: 7.4 % (ref 0–13.4)
NEUTROPHILS # BLD AUTO: 3.55 K/UL (ref 1.82–7.42)
NEUTROPHILS NFR BLD: 58.5 % (ref 44–72)
NRBC # BLD AUTO: 0 K/UL
NRBC BLD-RTO: 0 /100 WBC (ref 0–0.2)
PLATELET # BLD AUTO: 282 K/UL (ref 164–446)
PMV BLD AUTO: 10.1 FL (ref 9–12.9)
POTASSIUM SERPL-SCNC: 4 MMOL/L (ref 3.6–5.5)
PROT SERPL-MCNC: 6.5 G/DL (ref 6–8.2)
RBC # BLD AUTO: 4.35 M/UL (ref 4.2–5.4)
SODIUM SERPL-SCNC: 139 MMOL/L (ref 135–145)
WBC # BLD AUTO: 6.1 K/UL (ref 4.8–10.8)

## 2023-12-13 PROCEDURE — 80053 COMPREHEN METABOLIC PANEL: CPT

## 2023-12-13 PROCEDURE — 700111 HCHG RX REV CODE 636 W/ 250 OVERRIDE (IP): Mod: JZ | Performed by: EMERGENCY MEDICINE

## 2023-12-13 PROCEDURE — 84703 CHORIONIC GONADOTROPIN ASSAY: CPT

## 2023-12-13 PROCEDURE — 85025 COMPLETE CBC W/AUTO DIFF WBC: CPT

## 2023-12-13 PROCEDURE — 83690 ASSAY OF LIPASE: CPT

## 2023-12-13 PROCEDURE — 76705 ECHO EXAM OF ABDOMEN: CPT

## 2023-12-13 PROCEDURE — 96374 THER/PROPH/DIAG INJ IV PUSH: CPT

## 2023-12-13 PROCEDURE — 36415 COLL VENOUS BLD VENIPUNCTURE: CPT

## 2023-12-13 PROCEDURE — 99284 EMERGENCY DEPT VISIT MOD MDM: CPT

## 2023-12-13 PROCEDURE — 96375 TX/PRO/DX INJ NEW DRUG ADDON: CPT

## 2023-12-13 RX ORDER — MORPHINE SULFATE 4 MG/ML
4 INJECTION INTRAVENOUS ONCE
Status: COMPLETED | OUTPATIENT
Start: 2023-12-13 | End: 2023-12-13

## 2023-12-13 RX ORDER — ONDANSETRON 2 MG/ML
4 INJECTION INTRAMUSCULAR; INTRAVENOUS ONCE
Status: COMPLETED | OUTPATIENT
Start: 2023-12-13 | End: 2023-12-13

## 2023-12-13 RX ADMIN — ONDANSETRON 4 MG: 2 INJECTION INTRAMUSCULAR; INTRAVENOUS at 08:05

## 2023-12-13 RX ADMIN — MORPHINE SULFATE 4 MG: 4 INJECTION, SOLUTION INTRAMUSCULAR; INTRAVENOUS at 08:05

## 2023-12-13 ASSESSMENT — PAIN DESCRIPTION - PAIN TYPE: TYPE: ACUTE PAIN

## 2023-12-13 NOTE — ED NOTES
Discharge instructions given to pt including follow up with [facility] or returning if no improvement of symptoms or to return if worse. Questions answered by RN. Denies any new complaints. Discharged w/stable vitals and able to ambulate to the lobby with steady gait.  to drive pt home.

## 2023-12-13 NOTE — ED PROVIDER NOTES
ED Provider Note    CHIEF COMPLAINT  Chief Complaint   Patient presents with    Abdominal Pain     Pt reporting RUQ pain that started yesterday morning. Pt reporting +Nausea, Denies vomiting or diarrhea        EXTERNAL RECORDS REVIEWED  Outpatient Notes I reviewed the patient's outpatient visit with primary care doctor to establish care on 2023    HPI/ROS  LIMITATION TO HISTORY   Select: : None  OUTSIDE HISTORIAN(S):  None    Cheyanne Quezada is a 33 y.o. female who presents with severe epigastric pain and right upper quadrant pain that goes to the back for 24 hours.  Patient reports the pain is worse with eating.  She denies any family history of gallbladder disease.  She has never had pain like this before.    PAST MEDICAL HISTORY   has a past medical history of Functional ovarian cysts, History of intravenous drug use in remission (2014), History of intravenous drug use in remission (2014), Mood disorder (HCC) (2014), and Seasonal allergies.    SURGICAL HISTORY   has a past surgical history that includes tonsillectomy ().    FAMILY HISTORY  Family History   Problem Relation Age of Onset    Colorectal Cancer Mother     Cancer Mother     Non-contributory Father     Hypertension Maternal Aunt     Cancer Paternal Uncle         lung cancer    Cancer Maternal Grandfather     Cancer Paternal Grandfather     Heart Attack Paternal Grandfather         x 2    Cancer Other         lung cancer, cancer related to alcohol and unknown type of cancer    Diabetes Neg Hx     Stroke Neg Hx        SOCIAL HISTORY  Social History     Tobacco Use    Smoking status: Former     Current packs/day: 0.00     Average packs/day: 0.5 packs/day for 10.0 years (5.0 ttl pk-yrs)     Types: Cigarettes     Start date: 2006     Quit date: 2016     Years since quittin.9    Smokeless tobacco: Never   Vaping Use    Vaping Use: Some days    Start date: 2021    Substances: Nicotine, Flavoring    Devices:  "Disposable    Passive vaping exposure: Yes   Substance and Sexual Activity    Alcohol use: Yes     Alcohol/week: 1.2 oz     Types: 2 Standard drinks or equivalent per week     Comment: occ    Drug use: No     Types: IV     Comment: hx of heroin     Sexual activity: Yes     Partners: Male     Birth control/protection: None     Comment:        CURRENT MEDICATIONS  Home Medications       Reviewed by Norma Sanders R.N. (Registered Nurse) on 12/13/23 at 0700  Med List Status: Partial     Medication Last Dose Status   acetaminophen (TYLENOL) 325 MG TABS  Active   hydrOXYzine HCl (ATARAX) 10 MG Tab  Active   Multiple Vitamins-Minerals (WOMENS MULTI PO)  Active   sertraline (ZOLOFT) 100 MG Tab  Active                    ALLERGIES  Allergies   Allergen Reactions    Amoxicillin      headace       PHYSICAL EXAM  VITAL SIGNS: /79   Pulse (!) 55   Temp 36.2 °C (97.2 °F) (Temporal)   Resp 16   Ht 1.651 m (5' 5\")   Wt 113 kg (248 lb 10.9 oz)   LMP 11/15/2023 (Approximate)   SpO2 100%   BMI 41.38 kg/m²    Constitutional: Alert.  HENT: No signs of trauma, Bilateral external ears normal, Nose normal.   Eyes: Pupils are equal and reactive, Conjunctiva normal, Non-icteric.   Neck: Normal range of motion, No tenderness, Supple, No stridor.   Lymphatic: No lymphadenopathy noted.   Cardiovascular: Regular rate and rhythm, no murmurs.   Thorax & Lungs: Normal breath sounds, No respiratory distress, No wheezing, No chest tenderness.   Abdomen: Bowel sounds normal, Soft, epigastric tenderness with no peritoneal signs.  Skin: Warm, Dry, No erythema, No rash.   Back: No bony tenderness, No CVA tenderness.   Extremities: Intact distal pulses, No edema, No tenderness, No cyanosis.  Musculoskeletal: Good range of motion in all major joints. No tenderness to palpation or major deformities noted.   Neurologic: Alert , Normal motor function, Normal sensory function, No focal deficits noted.   Psychiatric: Affect normal, " Judgment normal, Mood normal.       DIAGNOSTIC STUDIES / PROCEDURES      LABS  Labs Reviewed   CBC WITH DIFFERENTIAL - Abnormal; Notable for the following components:       Result Value    Hemoglobin 11.9 (*)     MCHC 31.7 (*)     All other components within normal limits   COMP METABOLIC PANEL - Abnormal; Notable for the following components:    Glucose 102 (*)     AST(SGOT) 11 (*)     All other components within normal limits   LIPASE   HCG QUAL SERUM   ESTIMATED GFR         RADIOLOGY  I have independently interpreted the diagnostic imaging associated with this visit and am waiting the final reading from the radiologist.   My preliminary interpretation is as follows: No gallstones  Radiologist interpretation:     US-RUQ   Final Result      Unremarkable RIGHT upper quadrant ultrasound            COURSE & MEDICAL DECISION MAKING    ED Observation Status? Yes; I am placing the patient in to an observation status due to a diagnostic uncertainty as well as therapeutic intensity. Patient placed in observation status at 7:42 AM, 12/13/2023.     Observation plan is as follows: The patient presents with epigastric and right upper quadrant pain.  Differential diagnose includes gallbladder disease.  Ultrasound was ordered, labs ordered.  Patient was given 4 mg IV morphine and 4 mg IV Zofran.    Upon Reevaluation, the patient's condition has: Improved; and will be discharged.    Patient discharged from ED Observation status at 10:09 AM (Time) December 13, 2023 (Date).     INITIAL ASSESSMENT, COURSE AND PLAN  Care Narrative:     The patient's labs are unremarkable.  Her ultrasound is negative.  The patient may have a akinetic gallbladder or gastritis, possible ulcer.  I referred her to GI for possible outpatient HIDA scan and/or endoscopy.  She will return for right lower quadrant pain or worsening symptoms.        ADDITIONAL PROBLEM LIST  Ovarian cyst history, seasonal allergies  DISPOSITION AND DISCUSSIONS  I have discussed  management of the patient with the following physicians and GET's: None    Discussion of management with other Q or appropriate source(s): None     Escalation of care considered, and ultimately not performed:acute inpatient care management, however at this time, the patient is most appropriate for outpatient management    Barriers to care at this time, including but not limited to:  None .     Decision tools and prescription drugs considered including, but not limited to:  Considered prescription for pain medicine but the patient will take over-the-counter pain medications .    The patient will return for new or worsening symptoms and is stable at the time of discharge.    The patient is referred to a primary physician for blood pressure management, diabetic screening, and for all other preventative health concerns.      DISPOSITION:  Patient will be discharged home in stable condition.    FOLLOW UP:  [facility], Emergency Dept  [address] [facility] [facility] [address] [phone]    If symptoms worsen    [facility]  [address] [facility] [address] [phone]    call for HIDA scan and/or endoscopy if symptoms do not resolve    [facility]  [address] [place] [id number]  [phone]    Call for follow-up for possible HIDA scan or endoscopy if symptoms do not resolve      OUTPATIENT MEDICATIONS:  New Prescriptions    No medications on file         FINAL DIAGNOSIS  1. Epigastric abdominal pain           Electronically signed by: Oneil Small M.D., 12/13/2023 7:42 AM

## 2023-12-13 NOTE — ED TRIAGE NOTES
"Chief Complaint   Patient presents with    Abdominal Pain     Pt reporting RUQ pain that started yesterday morning. Pt reporting +Nausea, Denies vomiting or diarrhea        Pt is alert and oriented, speaking in full sentences, follows commands and responds appropriately to questions. Resperations are even and unlabored.      Pt placed in lobby. Pt educated on triage process. Pt encouraged to alert staff for any changes.     Patient and staff wearing appropriate PPE.    BP (!) 168/99   Pulse 68   Temp 36.2 °C (97.2 °F) (Temporal)   Resp 18   Ht 1.651 m (5' 5\")   Wt 113 kg (248 lb 10.9 oz)   SpO2 99%    "

## 2024-07-30 ENCOUNTER — PATIENT MESSAGE (OUTPATIENT)
Dept: MEDICAL GROUP | Facility: MEDICAL CENTER | Age: 34
End: 2024-07-30
Payer: COMMERCIAL

## 2024-07-30 DIAGNOSIS — F33.1 MODERATE EPISODE OF RECURRENT MAJOR DEPRESSIVE DISORDER (HCC): ICD-10-CM

## 2024-07-30 RX ORDER — SERTRALINE HYDROCHLORIDE 100 MG/1
100 TABLET, FILM COATED ORAL DAILY
Qty: 90 TABLET | Refills: 3 | Status: SHIPPED | OUTPATIENT
Start: 2024-07-30

## 2024-08-05 ENCOUNTER — APPOINTMENT (OUTPATIENT)
Dept: MEDICAL GROUP | Facility: MEDICAL CENTER | Age: 34
End: 2024-08-05
Payer: COMMERCIAL

## 2024-12-30 ENCOUNTER — TELEPHONE (OUTPATIENT)
Dept: MEDICAL GROUP | Facility: LAB | Age: 34
End: 2024-12-30
Payer: COMMERCIAL

## 2024-12-31 NOTE — TELEPHONE ENCOUNTER
Spoke with pt 12/30/24 about no shows. Pt is aware that if she no shows 1 more apt she will be dismissed from the practice.

## 2025-01-21 ENCOUNTER — OFFICE VISIT (OUTPATIENT)
Dept: MEDICAL GROUP | Facility: MEDICAL CENTER | Age: 35
End: 2025-01-21
Payer: COMMERCIAL

## 2025-01-21 VITALS
WEIGHT: 256 LBS | SYSTOLIC BLOOD PRESSURE: 122 MMHG | OXYGEN SATURATION: 97 % | DIASTOLIC BLOOD PRESSURE: 74 MMHG | HEART RATE: 76 BPM | HEIGHT: 65 IN | BODY MASS INDEX: 42.65 KG/M2 | TEMPERATURE: 98.3 F

## 2025-01-21 DIAGNOSIS — K21.9 GASTROESOPHAGEAL REFLUX DISEASE, UNSPECIFIED WHETHER ESOPHAGITIS PRESENT: ICD-10-CM

## 2025-01-21 DIAGNOSIS — R10.11 RUQ PAIN: ICD-10-CM

## 2025-01-21 DIAGNOSIS — F33.1 MODERATE EPISODE OF RECURRENT MAJOR DEPRESSIVE DISORDER (HCC): ICD-10-CM

## 2025-01-21 DIAGNOSIS — F41.9 ANXIETY: ICD-10-CM

## 2025-01-21 DIAGNOSIS — R19.8 ALTERNATING CONSTIPATION AND DIARRHEA: ICD-10-CM

## 2025-01-21 DIAGNOSIS — L98.9 SKIN LESION OF FACE: ICD-10-CM

## 2025-01-21 PROCEDURE — 3078F DIAST BP <80 MM HG: CPT | Performed by: FAMILY MEDICINE

## 2025-01-21 PROCEDURE — 3074F SYST BP LT 130 MM HG: CPT | Performed by: FAMILY MEDICINE

## 2025-01-21 PROCEDURE — 99213 OFFICE O/P EST LOW 20 MIN: CPT | Performed by: FAMILY MEDICINE

## 2025-01-21 RX ORDER — HYDROXYZINE HYDROCHLORIDE 10 MG/1
10 TABLET, FILM COATED ORAL 3 TIMES DAILY PRN
Qty: 90 TABLET | Refills: 3 | Status: SHIPPED | OUTPATIENT
Start: 2025-01-21

## 2025-01-21 RX ORDER — SERTRALINE HYDROCHLORIDE 100 MG/1
100 TABLET, FILM COATED ORAL DAILY
Qty: 90 TABLET | Refills: 3 | Status: SHIPPED | OUTPATIENT
Start: 2025-01-21

## 2025-01-21 ASSESSMENT — FIBROSIS 4 INDEX: FIB4 SCORE: 0.33

## 2025-01-21 NOTE — PROGRESS NOTES
Verbal consent was acquired by the patient to use [employer] ambient listening note generation during this visit:  Yes      Chief complaint::Diagnoses of Skin lesion of face, Gastroesophageal reflux disease, unspecified whether esophagitis present, Alternating constipation and diarrhea, and RUQ pain were pertinent to this visit.    Assessment and Plan:   The following treatment plan was discussed:     Assessment & Plan  1. Acid reflux - Chronic, possible hiatal hernia  - Advise weight reduction, dietary changes (reduce spicy, fatty, greasy, acidic foods)  - Referral to gastroenterology  - Try Prilosec or Pepcid, caution with prolonged Prilosec use  - Ordered HIDA scan for gallbladder function    2. Alternating constipation and diarrhea - Chronic, family history of colon cancer  - Advise increased fiber intake, food symptom diary  - Referral to gastroenterology for potential colonoscopy    3. External hemorrhoids - Chronic, stable  - No erythema or swelling  - Discussed gastroenterology referral for banding  - Advise topical Preparation H, increased fiber intake to prevent constipation and bulkify stool    4. Mole on right temporal area -subacute.  Symmetric, uniform color, definitive borders  - Referral to dermatology    5.  Right upper quadrant pain -chronic, reoccurring  -Reviewed ultrasound from her ER visit back in December 2023 which showed no gallstones.  -Nuclear medicine HIDA scan ordered for the patient  Cheyanne was seen today for gi problem, diarrhea, constipation, colon cancer, heartburn and foreign body in skin.    Diagnoses and all orders for this visit:    Skin lesion of face  -     Referral to Dermatology    Gastroesophageal reflux disease, unspecified whether esophagitis present  -     Referral to Gastroenterology    Alternating constipation and diarrhea  -     Referral to Gastroenterology    RUQ pain  -     NM-BILIARY HIDA SCAN FATTY MEAL; Future        Followup: Return if symptoms worsen or fail to  improve.    Subjective/HPI:   HPI:    Cheyanne Quezada is a pleasant 34 y.o. female here for   Chief Complaint   Patient presents with    GI Problem     Stomach pain when eating     Diarrhea    Constipation    Colon Cancer     Mom had it, would like colonoscopy , has bump near rear end     Heartburn    Foreign Body in Skin     Mole near right temple, itchy         History of Present Illness  34-year-old individual presents with diarrhea, constipation, nausea, acid reflux, and postprandial pain.    Heartburn has progressively worsened over the past few years, now constant for 6 months. Reports postprandial pain and occasional blood in stool, typically on toilet paper. Had severe abdominal pain suggestive of gallbladder attack; ER visit and ultrasound were unremarkable. Continues to experience similar, less severe pain. Manages symptoms with Tums, discontinued Pepcid due to rebound effects. Rarely uses ibuprofen.    Alternating constipation and diarrhea for 6 months, stools are soft. Manages with warm salt water, avoids laxatives due to pain.    Pink mole on forehead, doubled in size over the last year, occasionally scaly.    Lump around anus.    FAMILY HISTORY  Their mother had colon cancer at age 70.    MEDICATIONS  Current: Zoloft, Tums. Discontinued: Pepcid.    Current Medicines (including changes today)  Current Outpatient Medications   Medication Sig Dispense Refill    sertraline (ZOLOFT) 100 MG Tab Take 1 Tablet by mouth every day. 90 Tablet 3    hydrOXYzine HCl (ATARAX) 10 MG Tab Take 1 Tablet by mouth 3 times a day as needed for Anxiety. 90 Tablet 3    Multiple Vitamins-Minerals (WOMENS MULTI PO) Take  by mouth.      acetaminophen (TYLENOL) 325 MG TABS Take 650 mg by mouth every four hours as needed.       No current facility-administered medications for this visit.     Past Medical/ Surgical History  She  has a past medical history of Functional ovarian cysts, History of intravenous drug use in remission  "(2/21/2014), History of intravenous drug use in remission (2/21/2014), Mood disorder (HCC) (2/21/2014), and Seasonal allergies.    She has no past medical history of Diabetes.  She  has a past surgical history that includes tonsillectomy (1994).       Objective:   /74   Pulse 76   Temp 36.8 °C (98.3 °F)   Ht 1.651 m (5' 5\")   Wt 116 kg (256 lb)   SpO2 97%  Body mass index is 42.6 kg/m².    Physical exam was made by observation  Physical Exam  Constitutional:       General: She is not in acute distress.  HENT:      Head: Normocephalic and atraumatic.   Eyes:      Conjunctiva/sclera: Conjunctivae normal.      Pupils: Pupils are equal, round, and reactive to light.   Pulmonary:      Effort: Pulmonary effort is normal. No respiratory distress.   Abdominal:      General: There is no distension.   Genitourinary:      Musculoskeletal:      Cervical back: Normal range of motion and neck supple.   Skin:     General: Skin is warm and dry.      Findings: No rash.   Neurological:      Mental Status: She is alert and oriented to person, place, and time.      Gait: Gait is intact.   Psychiatric:         Mood and Affect: Affect normal.        Lab/ Imaging Results:  Results  - Ultrasound of gallbladder:    - No abnormalities    Please note that this dictation was created using voice recognition software. I have made every reasonable attempt to correct obvious errors, but I expect that there are errors of grammar and possibly content that I did not discover before finalizing the note.      "

## 2025-01-27 DIAGNOSIS — R10.11 RUQ PAIN: ICD-10-CM

## 2025-01-28 ENCOUNTER — HOSPITAL ENCOUNTER (OUTPATIENT)
Dept: RADIOLOGY | Facility: MEDICAL CENTER | Age: 35
End: 2025-01-28
Attending: FAMILY MEDICINE
Payer: COMMERCIAL

## 2025-01-28 DIAGNOSIS — R10.11 RUQ PAIN: ICD-10-CM

## 2025-01-28 PROCEDURE — 76705 ECHO EXAM OF ABDOMEN: CPT

## 2025-02-06 ENCOUNTER — HOSPITAL ENCOUNTER (OUTPATIENT)
Dept: RADIOLOGY | Facility: MEDICAL CENTER | Age: 35
End: 2025-02-06
Attending: FAMILY MEDICINE
Payer: COMMERCIAL

## 2025-02-06 DIAGNOSIS — R10.11 RUQ PAIN: ICD-10-CM

## 2025-02-07 ENCOUNTER — PATIENT MESSAGE (OUTPATIENT)
Dept: MEDICAL GROUP | Facility: MEDICAL CENTER | Age: 35
End: 2025-02-07
Payer: COMMERCIAL

## 2025-02-07 DIAGNOSIS — R10.11 RUQ PAIN: ICD-10-CM

## 2025-02-12 NOTE — Clinical Note
REFERRAL APPROVAL NOTICE         Sent on February 12, 2025                   Cheyanne Quezada  [address]  [place] [address]                   Dear Ms. Quezada,    After a careful review of the medical information and benefit coverage, [facility] has processed your referral. See below for additional details.    If applicable, you must be actively enrolled with your insurance for coverage of the authorized service. If you have any questions regarding your coverage, please contact your insurance directly.    REFERRAL INFORMATION   Referral #:  [id number]  Referred-To Department    Referred-By Provider:  Surgery    DAVID Tenorio   Department Of Surgery      [address]  [address]  [place] [address]  [phone] [address], [address]  [place] [id number] [phone]    Referral Start Date:  02/07/2025  Referral End Date:   02/07/2026             SCHEDULING  If you do not already have an appointment, please call [phone] to make an appointment.     MORE INFORMATION  If you do not already have a [facility] account, sign up at: [email or web address]  You can access your medical information, make appointments, see lab results, billing information, and more.  If you have questions regarding this referral, please contact  the [facility] Referrals department at:             [phone]. Monday - Friday 8:00AM - 5:00PM.     Sincerely,    [facility]